# Patient Record
Sex: MALE | Race: BLACK OR AFRICAN AMERICAN | NOT HISPANIC OR LATINO | URBAN - METROPOLITAN AREA
[De-identification: names, ages, dates, MRNs, and addresses within clinical notes are randomized per-mention and may not be internally consistent; named-entity substitution may affect disease eponyms.]

---

## 2024-01-13 ENCOUNTER — EMERGENCY (EMERGENCY)
Facility: HOSPITAL | Age: 64
LOS: 1 days | Discharge: HOME | End: 2024-01-13
Attending: EMERGENCY MEDICINE
Payer: MEDICARE

## 2024-01-13 PROCEDURE — 99053 MED SERV 10PM-8AM 24 HR FAC: CPT

## 2024-01-13 PROCEDURE — 99283 EMERGENCY DEPT VISIT LOW MDM: CPT | Mod: 25

## 2024-01-13 PROCEDURE — 93010 ELECTROCARDIOGRAM REPORT: CPT

## 2024-01-13 PROCEDURE — 93005 ELECTROCARDIOGRAM TRACING: CPT

## 2024-01-13 PROCEDURE — 99285 EMERGENCY DEPT VISIT HI MDM: CPT

## 2024-01-14 VITALS
SYSTOLIC BLOOD PRESSURE: 129 MMHG | RESPIRATION RATE: 15 BRPM | TEMPERATURE: 98 F | HEART RATE: 75 BPM | OXYGEN SATURATION: 96 % | DIASTOLIC BLOOD PRESSURE: 71 MMHG

## 2024-01-14 VITALS
HEART RATE: 83 BPM | TEMPERATURE: 98 F | DIASTOLIC BLOOD PRESSURE: 87 MMHG | RESPIRATION RATE: 16 BRPM | SYSTOLIC BLOOD PRESSURE: 149 MMHG | OXYGEN SATURATION: 99 %

## 2024-01-14 NOTE — ED PROVIDER NOTE - PHYSICAL EXAMINATION
Gen: well appearing, no acute distress  Head: normocephalic, atraumatic  EENT: EOMI, moist mucous membranes  Lung: no increased work of breathing, clear to auscultation bilaterally, speaking in full sentences  CV: regular rate, regular rhythm, normal s1/s2, 2+ radial pulses bilaterally  Abd: soft, non-tender, non-distended  MSK: No edema, no visible deformities, full range of motion in all 4 extremities  Neuro: Awake, alert, oriented x 4. no focal neurologic deficits  Skin: No obvious rash, no jaundice  Psych: normal affect, occasionally tangential speech Gen: well appearing, no acute distress  Head: normocephalic, atraumatic  EENT: EOMI, moist mucous membranes  Lung: no increased work of breathing, clear to auscultation bilaterally, speaking in full sentences  CV: regular rate, regular rhythm, normal s1/s2, 2+ radial pulses bilaterally  Abd: soft, non-tender, non-distended  MSK: No edema, no visible deformities, full range of motion in all 4 extremities  Neuro: Awake, alert, oriented x 3. no focal neurologic deficits  Skin: No obvious rash, no jaundice  Psych: normal affect, occasionally tangential speech
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
## 2024-01-14 NOTE — ED PROVIDER NOTE - NSFOLLOWUPINSTRUCTIONS_ED_ALL_ED_FT
General Assault  Assault includes any behavior or physical attack that results in injury or threat to another person or damage to their property. This also includes assault that has not yet happened but is planned to happen, as well as threats that cause fear of assault.    Threats of assault may be physical, verbal, or written. They may be spoken or sent by any form of communication or media. The threats may be direct, implied, or understood.    What are the different forms of assault?  Forms of assault include:  Physically assaulting a person directly by slapping, hitting, kicking, or pushing.  Threats to inflict physical harm, which may include:  Verbal threats with language that is intimidating, hostile, or abusive.  Throwing or hitting objects.  Making intimidating or threatening gestures.  Displaying an object that appears to be a weapon in a threatening manner.  Stalking.  Sexually assaulting a person. Sexual assault is any sexual activity that a person is forced, threatened, or coerced to participate in. It may or may not involve physical contact with the person who is assaulting you. You are sexually assaulted if you are forced to have sexual contact of any kind.  Damaging or destroying a person's assistive equipment, such as glasses, canes, or walkers.  Using or displaying a weapon to harm or threaten someone. Examples of weapons may include guns, knives, sticks, or bats.  Using greater physical size or strength to intimidate someone by restraining them with force or bullying.  What can I do if I experience assault?    Report assaults, threats, and stalking to the police. Call your local emergency services (911 in the [place] if you are in immediate danger or you need medical help.  Work with a  or an advocate to get legal protection against someone who has assaulted you or threatened you with assault. Protection options may include:  Getting a court order that requires the person to stay away from you (restraining order).  Moving you to a private address.  Prosecuting the person through the courts. Laws vary depending on where you live.  Follow these instructions at home:  Avoid areas where you feel unsafe.  Try to stay in areas that are around other people.  Consider learning methods of protection from assault, such as self-defense.  Where to find support    If you have experienced assault, you may seek help from:  A professional counselor, family member, clergy, or a trusted friend to talk about what happened.  [facility] Sexual Assault Hotline: 482.123.7374 ([facility]). Live chat is also available at [employer]  The National Center for Victims of Crime. This is an advocacy center that provides information for people who have been assaulted or subjected to violence. Visit www.victimsofcrime.org  Summary  Assault includes any behavior or physical attack that results in injury or threat to another person or damage to their property.  An assault includes threats that cause a person to fear for his or her safety. Threats may be spoken or sent by any form of communication or media.  There are many forms of assault.  Report assaults, threats, and stalking to the police. Call your local emergency services (911 in the [place] if you are in immediate danger or you need medical help.  Prevent assault by being aware of your surroundings, avoiding areas where you feel unsafe, and talking to a  about getting legal protection against someone who has assaulted you or threatened you with assault.  This information is not intended to replace advice given to you by your health care provider. Make sure you discuss any questions you have with your health care provider. General Assault  Assault includes any behavior or physical attack that results in injury or threat to another person or damage to their property. This also includes assault that has not yet happened but is planned to happen, as well as threats that cause fear of assault.    Threats of assault may be physical, verbal, or written. They may be spoken or sent by any form of communication or media. The threats may be direct, implied, or understood.    What are the different forms of assault?  Forms of assault include:  Physically assaulting a person directly by slapping, hitting, kicking, or pushing.  Threats to inflict physical harm, which may include:  Verbal threats with language that is intimidating, hostile, or abusive.  Throwing or hitting objects.  Making intimidating or threatening gestures.  Displaying an object that appears to be a weapon in a threatening manner.  Stalking.  Sexually assaulting a person. Sexual assault is any sexual activity that a person is forced, threatened, or coerced to participate in. It may or may not involve physical contact with the person who is assaulting you. You are sexually assaulted if you are forced to have sexual contact of any kind.  Damaging or destroying a person's assistive equipment, such as glasses, canes, or walkers.  Using or displaying a weapon to harm or threaten someone. Examples of weapons may include guns, knives, sticks, or bats.  Using greater physical size or strength to intimidate someone by restraining them with force or bullying.  What can I do if I experience assault?    Report assaults, threats, and stalking to the police. Call your local emergency services (911 in the [place] if you are in immediate danger or you need medical help.  Work with a  or an advocate to get legal protection against someone who has assaulted you or threatened you with assault. Protection options may include:  Getting a court order that requires the person to stay away from you (restraining order).  Moving you to a private address.  Prosecuting the person through the courts. Laws vary depending on where you live.  Follow these instructions at home:  Avoid areas where you feel unsafe.  Try to stay in areas that are around other people.  Consider learning methods of protection from assault, such as self-defense.  Where to find support    If you have experienced assault, you may seek help from:  A professional counselor, family member, clergy, or a trusted friend to talk about what happened.  [facility] Sexual Assault Hotline: 627.494.9411 ([facility]). Live chat is also available at [employer]  The National Center for Victims of Crime. This is an advocacy center that provides information for people who have been assaulted or subjected to violence. Visit www.victimsofcrime.org  Summary  Assault includes any behavior or physical attack that results in injury or threat to another person or damage to their property.  An assault includes threats that cause a person to fear for his or her safety. Threats may be spoken or sent by any form of communication or media.  There are many forms of assault.  Report assaults, threats, and stalking to the police. Call your local emergency services (911 in the [place] if you are in immediate danger or you need medical help.  Prevent assault by being aware of your surroundings, avoiding areas where you feel unsafe, and talking to a  about getting legal protection against someone who has assaulted you or threatened you with assault.  This information is not intended to replace advice given to you by your health care provider. Make sure you discuss any questions you have with your health care provider. General Assault  Assault includes any behavior or physical attack that results in injury or threat to another person or damage to their property. This also includes assault that has not yet happened but is planned to happen, as well as threats that cause fear of assault.    Threats of assault may be physical, verbal, or written. They may be spoken or sent by any form of communication or media. The threats may be direct, implied, or understood.    What are the different forms of assault?  Forms of assault include:  Physically assaulting a person directly by slapping, hitting, kicking, or pushing.  Threats to inflict physical harm, which may include:  Verbal threats with language that is intimidating, hostile, or abusive.  Throwing or hitting objects.  Making intimidating or threatening gestures.  Displaying an object that appears to be a weapon in a threatening manner.  Stalking.  Sexually assaulting a person. Sexual assault is any sexual activity that a person is forced, threatened, or coerced to participate in. It may or may not involve physical contact with the person who is assaulting you. You are sexually assaulted if you are forced to have sexual contact of any kind.  Damaging or destroying a person's assistive equipment, such as glasses, canes, or walkers.  Using or displaying a weapon to harm or threaten someone. Examples of weapons may include guns, knives, sticks, or bats.  Using greater physical size or strength to intimidate someone by restraining them with force or bullying.  What can I do if I experience assault?    Report assaults, threats, and stalking to the police. Call your local emergency services (911 in the [place] if you are in immediate danger or you need medical help.  Work with a  or an advocate to get legal protection against someone who has assaulted you or threatened you with assault. Protection options may include:  Getting a court order that requires the person to stay away from you (restraining order).  Moving you to a private address.  Prosecuting the person through the courts. Laws vary depending on where you live.  Follow these instructions at home:  Avoid areas where you feel unsafe.  Try to stay in areas that are around other people.  Consider learning methods of protection from assault, such as self-defense.  Where to find support    If you have experienced assault, you may seek help from:  A professional counselor, family member, clergy, or a trusted friend to talk about what happened.  [facility] Sexual Assault Hotline: 210.742.5849 ([facility]OPE). Live chat is also available at [employer]  The National Center for Victims of Crime. This is an advocacy center that provides information for people who have been assaulted or subjected to violence. Visit www.victimsofcrime.org  Summary  Assault includes any behavior or physical attack that results in injury or threat to another person or damage to their property.  An assault includes threats that cause a person to fear for his or her safety. Threats may be spoken or sent by any form of communication or media.  There are many forms of assault.  Report assaults, threats, and stalking to the police. Call your local emergency services (911 in the [place] if you are in immediate danger or you need medical help.  Prevent assault by being aware of your surroundings, avoiding areas where you feel unsafe, and talking to a  about getting legal protection against someone who has assaulted you or threatened you with assault.  This information is not intended to replace advice given to you by your health care provider. Make sure you discuss any questions you have with your health care provider. General Assault  Assault includes any behavior or physical attack that results in injury or threat to another person or damage to their property. This also includes assault that has not yet happened but is planned to happen, as well as threats that cause fear of assault.    Threats of assault may be physical, verbal, or written. They may be spoken or sent by any form of communication or media. The threats may be direct, implied, or understood.    What are the different forms of assault?  Forms of assault include:  Physically assaulting a person directly by slapping, hitting, kicking, or pushing.  Threats to inflict physical harm, which may include:  Verbal threats with language that is intimidating, hostile, or abusive.  Throwing or hitting objects.  Making intimidating or threatening gestures.  Displaying an object that appears to be a weapon in a threatening manner.  Stalking.  Sexually assaulting a person. Sexual assault is any sexual activity that a person is forced, threatened, or coerced to participate in. It may or may not involve physical contact with the person who is assaulting you. You are sexually assaulted if you are forced to have sexual contact of any kind.  Damaging or destroying a person's assistive equipment, such as glasses, canes, or walkers.  Using or displaying a weapon to harm or threaten someone. Examples of weapons may include guns, knives, sticks, or bats.  Using greater physical size or strength to intimidate someone by restraining them with force or bullying.  What can I do if I experience assault?    Report assaults, threats, and stalking to the police. Call your local emergency services (911 in the [place] if you are in immediate danger or you need medical help.  Work with a  or an advocate to get legal protection against someone who has assaulted you or threatened you with assault. Protection options may include:  Getting a court order that requires the person to stay away from you (restraining order).  Moving you to a private address.  Prosecuting the person through the courts. Laws vary depending on where you live.  Follow these instructions at home:  Avoid areas where you feel unsafe.  Try to stay in areas that are around other people.  Consider learning methods of protection from assault, such as self-defense.  Where to find support    If you have experienced assault, you may seek help from:  A professional counselor, family member, clergy, or a trusted friend to talk about what happened.  [facility] [place] Sexual Assault Hotline: 615.465.3050 ([facility]OPE). Live chat is also available at [employer]  The National Center for Victims of Crime. This is an advocacy center that provides information for people who have been assaulted or subjected to violence. Visit www.victimsofcrime.org  Summary  Assault includes any behavior or physical attack that results in injury or threat to another person or damage to their property.  An assault includes threats that cause a person to fear for his or her safety. Threats may be spoken or sent by any form of communication or media.  There are many forms of assault.  Report assaults, threats, and stalking to the police. Call your local emergency services (911 in the [place] if you are in immediate danger or you need medical help.  Prevent assault by being aware of your surroundings, avoiding areas where you feel unsafe, and talking to a  about getting legal protection against someone who has assaulted you or threatened you with assault.  This information is not intended to replace advice given to you by your health care provider. Make sure you discuss any questions you have with your health care provider.
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
## 2024-01-14 NOTE — ED PROVIDER NOTE - PATIENT PORTAL LINK FT
You can access the FollowMyHealth Patient Portal offered by [facility] by registering at the following website: [email or web address]. By joining [employer]’s FollowMyHealth portal, you will also be able to view your health information using other applications (apps) compatible with our system. You can access the FollowMyHealth Patient Portal offered by [facility] by registering at the following website: [email or web address]. By joining [employer]’s FollowMyHealth portal, you will also be able to view your health information using other applications (apps) compatible with our system. You can access the FollowMyHealth Patient Portal offered by [facility] by registering at the following website: [email or web address]. By joining [employer]’s FollowMyHealth portal, you will also be able to view your health information using other applications (apps) compatible with our system. You can access the FollowMyHealth Patient Portal offered by [facility] by registering at the following website: [email or web address]. By joining [employer]’s FollowMyHealth portal, you will also be able to view your health information using other applications (apps) compatible with our system.
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
## 2024-01-14 NOTE — ED ADULT TRIAGE NOTE - NS ED NURSE AMBULANCES
[facility]
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
## 2024-01-19 ENCOUNTER — EMERGENCY (EMERGENCY)
Facility: HOSPITAL | Age: 64
LOS: 1 days | Discharge: HOME | End: 2024-01-19
Attending: STUDENT IN AN ORGANIZED HEALTH CARE EDUCATION/TRAINING PROGRAM
Payer: MEDICARE

## 2024-01-19 VITALS
SYSTOLIC BLOOD PRESSURE: 176 MMHG | HEART RATE: 84 BPM | TEMPERATURE: 98 F | OXYGEN SATURATION: 99 % | RESPIRATION RATE: 18 BRPM | DIASTOLIC BLOOD PRESSURE: 82 MMHG

## 2024-01-19 PROCEDURE — 99283 EMERGENCY DEPT VISIT LOW MDM: CPT

## 2024-01-19 PROCEDURE — 99284 EMERGENCY DEPT VISIT MOD MDM: CPT
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
## 2024-01-19 NOTE — ED PROVIDER NOTE - CLINICAL SUMMARY MEDICAL DECISION MAKING FREE TEXT BOX
Patient examines clinically sober at this time.  Requesting social work evaluation to assist with either shelter placement versus assistance in maneuvering situation at home.  Patient denies any feeling unsafe at this time however is unsure if he can go back to his home.   Fiorella pop, will talk with patient.

## 2024-01-19 NOTE — ED PROVIDER NOTE - PROGRESS NOTE DETAILS
Patient seen by ED , advised patient on what he should do tomorrow.  Given no safe discharge for this evening, will discharge tomorrow morning.  Patient understanding and agreeable with plan.  Zev Alvarado MD. Jono: Pt received in signout from Dr. Alvarado. Will arrange ambulance back to pt's residence. Stable for discharge.

## 2024-01-19 NOTE — ED PROVIDER NOTE - NSFOLLOWUPINSTRUCTIONS_ED_ALL_ED_FT
1) Follow-up with your Primary Medical Doctor or referred doctor. Call today / next business day for prompt follow-up.  2) Call 911 or go to the ED should your symptoms worsen, or should you develop any concerns whatsoever regarding your condition. Call 911 or return to the ED if you develop a fever greater than 101 F. Return to the ED if you develop chest pain, shortness of breath, weakness or ANY WORSENING OR CONCERNING SYMPTOM.  3)Your health is important to us. Should you have any concerns or questions about your condition, please do not hesitate to return to the Emergency Department.  4) See attached instruction sheets for additional information, including information regarding signs and symptoms to look out for, reasons to seek immediate care and other important instructions.  5) Follow-up with any specialists as discussed / noted as well.  6) If you were prescribed medications, please take them as prescribed.

## 2024-01-19 NOTE — ED ADULT TRIAGE NOTE - AS TEMP SITE
Anesthesia Post Evaluation    Patient: Pito Koehler    Procedure(s) Performed: Procedure(s) (LRB):  ADENOIDECTOMY (N/A)    Final Anesthesia Type: general      Patient location during evaluation: PACU  Patient participation: Yes- Able to Participate  Level of consciousness: awake and alert  Post-procedure vital signs: reviewed and stable  Pain management: adequate  Airway patency: patent    PONV status at discharge: No PONV  Anesthetic complications: no      Cardiovascular status: blood pressure returned to baseline  Respiratory status: unassisted, spontaneous ventilation and room air  Hydration status: euvolemic  Follow-up not needed.              Vitals Value Taken Time   /84 12/11/23 1012   Temp 36.7 °C (98.1 °F) 12/11/23 0951   Pulse 100 12/11/23 1012   Resp 20 12/11/23 1012   SpO2 100 % 12/11/23 1012         Event Time   Out of Recovery 10:27:58         Pain/Melita Score: Presence of Pain: non-verbal indicators present (12/11/2023 10:27 AM)           oral

## 2024-01-19 NOTE — ED ADULT NURSE NOTE - OBJECTIVE STATEMENT
Patient presents to ED after drinking several beers and having dispute with landkevend, coming in for evaluation and social work/living arrangement concerns.  Pt in no distress, no medical concerns at this time.

## 2024-01-19 NOTE — ED ADULT TRIAGE NOTE - CHIEF COMPLAINT QUOTE
patient brought in by ambulance admitting to drinking beer torres, ems reports they were called because PD needed him removed during an argument with haseeb, patient is cooperative without complaints

## 2024-01-19 NOTE — ED PROVIDER NOTE - PATIENT PORTAL LINK FT
You can access the FollowMyHealth Patient Portal offered by [facility] by registering at the following website: [email or web address]. By joining [email or web address]’s FollowMyHealth portal, you will also be able to view your health information using other applications (apps) compatible with our system.

## 2024-01-20 VITALS
TEMPERATURE: 98 F | SYSTOLIC BLOOD PRESSURE: 115 MMHG | OXYGEN SATURATION: 94 % | RESPIRATION RATE: 18 BRPM | HEART RATE: 85 BPM | DIASTOLIC BLOOD PRESSURE: 61 MMHG

## 2024-01-20 NOTE — ED ADULT NURSE REASSESSMENT NOTE - NS ED NURSE REASSESS COMMENT FT1
Patient resting comfortably on stretcher at this time, pending d/c home.  No medical concerns voiced.  Safety maintained.

## 2024-01-20 NOTE — ED ADULT NURSE REASSESSMENT NOTE - NS ED NURSE REASSESS COMMENT FT1
Patient brought in by ambulance admitting to drinking beer last night, ems reports they were called because PD needed him removed during an argument with haseeb. Today the patient is cooperative without complaints. Patient received from the over night shift. Patient is resting awaiting transport.

## 2024-01-26 NOTE — ED ADULT NURSE NOTE - CAS TRG GENERAL NORM CIRC DET
Patient is a 82y old  Male who presents with a chief complaint of failure to thrive, emerald, dehydration (25 Jan 2024 19:00)    Type: DX year known complications Endocrine Last seen Rx home Hx DKA/HHS, Glucometer checks, needs, weight, diet, exercise  diabetes education provided  Hx ASCVD, CKD, HF    HPI:  82yM pmhx HTN, T2DM, CVA, brought to ED from home for generalized weakness. Patient had recent GI illness with diarrhea x 3 days with "countless" episodes of watery diarrhea. Patient states he's had no appetite for the last 3 days and has been unable to eat at all. Patient has had very low fluid intake. Patient is accompanied by daughter who states that for the last week he has been very weak and he has trouble standing up and walking around. Patient's daughter states that the patient tested positive for the flu last week. The patient denies any symptoms of chest pain, SOB, nausea, vomiting.     ED Course    Vitals: 97.8F, 89bpm, 104/71, 99% RA  Labs: Hgb 12.5, wbc 3.22, Na 133, BUN 29, Cr 1.9, glucose 274, trop wnl  CT head: Parenchymal volume loss and chronic microvessel ischemic changes are identified. Old lacunar infarcts involving the right basal ganglia region are again seen.  CXR: no acute findings  EKG: NSR    Received in ED: 1L NS x 2  (22 Jan 2024 15:54)      PAST MEDICAL & SURGICAL HISTORY:  Diabetes      Hyperlipidemia      Kidney stone      CVA (cerebral vascular accident)      H/O lithotripsy      S/P inguinal hernia repair      H/O hand surgery          Allergies    No Known Allergies    Intolerances        MEDICATIONS  (STANDING):  amLODIPine   Tablet 5 milliGRAM(s) Oral daily  atorvastatin 20 milliGRAM(s) Oral at bedtime  dextrose 5%. 1000 milliLiter(s) (50 mL/Hr) IV Continuous <Continuous>  dextrose 5%. 1000 milliLiter(s) (100 mL/Hr) IV Continuous <Continuous>  dextrose 50% Injectable 12.5 Gram(s) IV Push once  dextrose 50% Injectable 25 Gram(s) IV Push once  dextrose 50% Injectable 25 Gram(s) IV Push once  glucagon  Injectable 1 milliGRAM(s) IntraMuscular once  heparin   Injectable 5000 Unit(s) SubCutaneous every 8 hours  influenza  Vaccine (HIGH DOSE) 0.7 milliLiter(s) IntraMuscular once  insulin lispro (ADMELOG) corrective regimen sliding scale   SubCutaneous at bedtime  insulin lispro (ADMELOG) corrective regimen sliding scale   SubCutaneous three times a day before meals  polyethylene glycol 3350 17 Gram(s) Oral daily  senna 1 Tablet(s) Oral at bedtime  tamsulosin 0.4 milliGRAM(s) Oral at bedtime       Strong peripheral pulses/Capillary refill less/equal to 2 seconds

## 2024-02-20 ENCOUNTER — EMERGENCY (EMERGENCY)
Facility: HOSPITAL | Age: 64
LOS: 1 days | Discharge: HOME | End: 2024-02-20
Attending: STUDENT IN AN ORGANIZED HEALTH CARE EDUCATION/TRAINING PROGRAM
Payer: MEDICARE

## 2024-02-20 ENCOUNTER — EMERGENCY (EMERGENCY)
Facility: HOSPITAL | Age: 64
LOS: 0 days | Discharge: HOME | End: 2024-02-20
Attending: STUDENT IN AN ORGANIZED HEALTH CARE EDUCATION/TRAINING PROGRAM
Payer: MEDICARE

## 2024-02-20 VITALS
RESPIRATION RATE: 18 BRPM | DIASTOLIC BLOOD PRESSURE: 102 MMHG | TEMPERATURE: 98 F | OXYGEN SATURATION: 98 % | HEART RATE: 79 BPM | SYSTOLIC BLOOD PRESSURE: 131 MMHG

## 2024-02-20 VITALS — WEIGHT: 179.9 LBS | HEIGHT: 65 IN

## 2024-02-20 DIAGNOSIS — Z79.01 LONG TERM (CURRENT) USE OF ANTICOAGULANTS: ICD-10-CM

## 2024-02-20 DIAGNOSIS — I10 ESSENTIAL (PRIMARY) HYPERTENSION: ICD-10-CM

## 2024-02-20 DIAGNOSIS — Z88.0 ALLERGY STATUS TO PENICILLIN: ICD-10-CM

## 2024-02-20 DIAGNOSIS — Z53.29 PROCEDURE AND TREATMENT NOT CARRIED OUT BECAUSE OF PATIENT'S DECISION FOR OTHER REASONS: ICD-10-CM

## 2024-02-20 DIAGNOSIS — T82.118A BREAKDOWN (MECHANICAL) OF OTHER CARDIAC ELECTRONIC DEVICE, INITIAL ENCOUNTER: ICD-10-CM

## 2024-02-20 DIAGNOSIS — Z86.718 PERSONAL HISTORY OF OTHER VENOUS THROMBOSIS AND EMBOLISM: ICD-10-CM

## 2024-02-20 DIAGNOSIS — Z95.0 PRESENCE OF CARDIAC PACEMAKER: ICD-10-CM

## 2024-02-20 DIAGNOSIS — Z51.89 ENCOUNTER FOR OTHER SPECIFIED AFTERCARE: ICD-10-CM

## 2024-02-20 LAB
ALBUMIN SERPL ELPH-MCNC: 3.7 G/DL — SIGNIFICANT CHANGE UP (ref 3.3–5)
ALP SERPL-CCNC: 102 U/L — SIGNIFICANT CHANGE UP (ref 40–120)
ALT FLD-CCNC: 33 U/L — SIGNIFICANT CHANGE UP (ref 12–78)
ANION GAP SERPL CALC-SCNC: 10 MMOL/L — SIGNIFICANT CHANGE UP (ref 5–17)
AST SERPL-CCNC: 34 U/L — SIGNIFICANT CHANGE UP (ref 15–37)
BASOPHILS # BLD AUTO: 0 K/UL — SIGNIFICANT CHANGE UP (ref 0–0.2)
BASOPHILS NFR BLD AUTO: 0 % — SIGNIFICANT CHANGE UP (ref 0–2)
BILIRUB SERPL-MCNC: 0.2 MG/DL — SIGNIFICANT CHANGE UP (ref 0.2–1.2)
BUN SERPL-MCNC: 8 MG/DL — SIGNIFICANT CHANGE UP (ref 7–23)
CALCIUM SERPL-MCNC: 9.3 MG/DL — SIGNIFICANT CHANGE UP (ref 8.5–10.1)
CHLORIDE SERPL-SCNC: 107 MMOL/L — SIGNIFICANT CHANGE UP (ref 96–108)
CO2 SERPL-SCNC: 22 MMOL/L — SIGNIFICANT CHANGE UP (ref 22–31)
CREAT SERPL-MCNC: 0.61 MG/DL — SIGNIFICANT CHANGE UP (ref 0.5–1.3)
EGFR: 108 ML/MIN/1.73M2 — SIGNIFICANT CHANGE UP
EOSINOPHIL # BLD AUTO: 0.07 K/UL — SIGNIFICANT CHANGE UP (ref 0–0.5)
EOSINOPHIL NFR BLD AUTO: 1 % — SIGNIFICANT CHANGE UP (ref 0–6)
GLUCOSE SERPL-MCNC: 100 MG/DL — HIGH (ref 70–99)
HCT VFR BLD CALC: 42.8 % — SIGNIFICANT CHANGE UP (ref 39–50)
HGB BLD-MCNC: 14.1 G/DL — SIGNIFICANT CHANGE UP (ref 13–17)
LYMPHOCYTES # BLD AUTO: 1.84 K/UL — SIGNIFICANT CHANGE UP (ref 1–3.3)
LYMPHOCYTES # BLD AUTO: 28 % — SIGNIFICANT CHANGE UP (ref 13–44)
MAGNESIUM SERPL-MCNC: 2 MG/DL — SIGNIFICANT CHANGE UP (ref 1.6–2.6)
MCHC RBC-ENTMCNC: 29.9 PG — SIGNIFICANT CHANGE UP (ref 27–34)
MCHC RBC-ENTMCNC: 32.9 GM/DL — SIGNIFICANT CHANGE UP (ref 32–36)
MCV RBC AUTO: 90.9 FL — SIGNIFICANT CHANGE UP (ref 80–100)
MONOCYTES # BLD AUTO: 1.18 K/UL — HIGH (ref 0–0.9)
MONOCYTES NFR BLD AUTO: 18 % — HIGH (ref 2–14)
NEUTROPHILS # BLD AUTO: 3.49 K/UL — SIGNIFICANT CHANGE UP (ref 1.8–7.4)
NEUTROPHILS NFR BLD AUTO: 53 % — SIGNIFICANT CHANGE UP (ref 43–77)
NRBC # BLD: SIGNIFICANT CHANGE UP /100 WBCS (ref 0–0)
PLATELET # BLD AUTO: 281 K/UL — SIGNIFICANT CHANGE UP (ref 150–400)
POTASSIUM SERPL-MCNC: 3.5 MMOL/L — SIGNIFICANT CHANGE UP (ref 3.5–5.3)
POTASSIUM SERPL-SCNC: 3.5 MMOL/L — SIGNIFICANT CHANGE UP (ref 3.5–5.3)
PROT SERPL-MCNC: 8.1 GM/DL — SIGNIFICANT CHANGE UP (ref 6–8.3)
RBC # BLD: 4.71 M/UL — SIGNIFICANT CHANGE UP (ref 4.2–5.8)
RBC # FLD: 15.1 % — HIGH (ref 10.3–14.5)
SODIUM SERPL-SCNC: 139 MMOL/L — SIGNIFICANT CHANGE UP (ref 135–145)
TROPONIN I, HIGH SENSITIVITY RESULT: 9.38 NG/L — SIGNIFICANT CHANGE UP
WBC # BLD: 6.58 K/UL — SIGNIFICANT CHANGE UP (ref 3.8–10.5)
WBC # FLD AUTO: 6.58 K/UL — SIGNIFICANT CHANGE UP (ref 3.8–10.5)

## 2024-02-20 PROCEDURE — 93005 ELECTROCARDIOGRAM TRACING: CPT

## 2024-02-20 PROCEDURE — L9997: CPT

## 2024-02-20 PROCEDURE — 93010 ELECTROCARDIOGRAM REPORT: CPT

## 2024-02-20 PROCEDURE — 99284 EMERGENCY DEPT VISIT MOD MDM: CPT | Mod: 25

## 2024-02-20 PROCEDURE — 71045 X-RAY EXAM CHEST 1 VIEW: CPT | Mod: 26

## 2024-02-20 PROCEDURE — 85025 COMPLETE CBC W/AUTO DIFF WBC: CPT

## 2024-02-20 PROCEDURE — 99285 EMERGENCY DEPT VISIT HI MDM: CPT | Mod: 25

## 2024-02-20 PROCEDURE — 80053 COMPREHEN METABOLIC PANEL: CPT

## 2024-02-20 PROCEDURE — 71045 X-RAY EXAM CHEST 1 VIEW: CPT | Mod: 26,76

## 2024-02-20 PROCEDURE — 71045 X-RAY EXAM CHEST 1 VIEW: CPT

## 2024-02-20 PROCEDURE — 84484 ASSAY OF TROPONIN QUANT: CPT

## 2024-02-20 PROCEDURE — 99285 EMERGENCY DEPT VISIT HI MDM: CPT

## 2024-02-20 PROCEDURE — 93010 ELECTROCARDIOGRAM REPORT: CPT | Mod: 76

## 2024-02-20 PROCEDURE — 83735 ASSAY OF MAGNESIUM: CPT

## 2024-02-20 PROCEDURE — 36415 COLL VENOUS BLD VENIPUNCTURE: CPT

## 2024-02-20 NOTE — ED PROVIDER NOTE - CLINICAL SUMMARY MEDICAL DECISION MAKING FREE TEXT BOX
Tiffanie Thibodeaux MD, Attending  plan: review earlier labs and workup, EKG, CXR, DC    pacemaker interrogation earlier today (copied from earlier provider note)  At that ED visit the Medtronic PM interrogated, report per [employer] Transmission Note:   "No device or lead performance issues observed. DEVICE ASSESSMENT: Available daily battery/lead measurements within expected range. Lead trends stable. ARRYTHMIA SUMMARY: Since data last cleared on 17-Aug-2023, Based on programmed zones, device detected/treated: 4 monitored VT-NS episodes most recent on 02-Jan-2024.

## 2024-02-20 NOTE — ED ADULT NURSE NOTE - OBJECTIVE STATEMENT
Pt presents to the ED AO x 4, c/o ACID misfire. Pt here earlier in the day and left AMA due to fear of needles. Pt reports he felt the AICD shock him today. Pt states he does not feel safe in his living situation. Pt reports he is scheduled to go to a different facility on Thursday, but does not want to wait till then. Pt endorses he is noncompliant with all his medications and states "I am waiting for God." Pt denies SI/HI. Respirations even and unlabored.

## 2024-02-20 NOTE — ED PROVIDER NOTE - OBJECTIVE STATEMENT
63 year old male with PMHx thyroid cancer (not actively being treated, on hospice), DVTs on eliquis, HTN, Medtronic pacemaker placement Jan 2019 in [place]. pt is here stating his pacemaker is firing multiple times a day. currently living ni [facility] shelter. pt recently seen 1/19 for housing issue and eviction. and before that pt seen at [facility] on 2/14/24 stating that his pacemaker has been firing multiple times a day.   At that ED visit the Medtronic PM interrogated, report per [employer] Transmission Note:   "No device or lead performance issues observed. DEVICE ASSESSMENT: Available daily battery/lead measurements within expected range. Lead trends stable. ARRYTHMIA SUMMARY: Since data last cleared on 17-Aug-2023, Based on programmed zones, device detected/treated: 4 monitored VT-NS episodes most recent on 02-Jan-2024.

## 2024-02-20 NOTE — ED PROVIDER NOTE - OBJECTIVE STATEMENT
C.C Pre-op Exam      HPI : Sonam Hidalgo is a 28 y.o. female  for evaluation and discussion of treatment options for Pre-op Exam  pelvic pain is significant.  From 10/10 pain it is down to 4/10.  She is ready for surgical intervention and ovarian cystectomy.  SHE HAS MIRENA IUD IN PLACE.   US shows  Endometrium: Normal in this pre menopausal patient, measuring 8 mm.    Right ovary:    Size: 7 1 x 7.7 x 8.0 cm    Appearance: There is a predominantly anechoic cyst measuring 6.5 x 7.2 x 7.3 cm (previously 5.7 x 5.0 x 6.6 cm) with thin internal septation.    Vascular flow: Normal.    Left ovary:    Not visualized    Free Fluid:    Small volume free fluid in the cul-de-sac, likely physiological.      Impression       No acute sonographic abnormality.  The left ovary is not visualized on today's exam.    There is slight interval enlargement of right ovarian cyst.         Past Medical History:   Diagnosis Date    Abnormal Pap smear of vagina     Allergy     Anxiety     Hypertension      Past Surgical History:   Procedure Laterality Date    CHOLECYSTECTOMY      median arcuate ligament        Family History   Problem Relation Age of Onset    Breast cancer Mother     Alcohol abuse Father     Celiac disease Neg Hx     Cirrhosis Neg Hx     Colon cancer Neg Hx     Colon polyps Neg Hx     Crohn's disease Neg Hx     Cystic fibrosis Neg Hx     Esophageal cancer Neg Hx     Hemochromatosis Neg Hx     Inflammatory bowel disease Neg Hx     Irritable bowel syndrome Neg Hx     Liver cancer Neg Hx     Liver disease Neg Hx     Rectal cancer Neg Hx     Stomach cancer Neg Hx     Ulcerative colitis Neg Hx     Jacob's disease Neg Hx     Ovarian cancer Neg Hx     Arrhythmia Neg Hx     Cardiomyopathy Neg Hx     Congenital heart disease Neg Hx     Early death Neg Hx     Heart attacks under age 50 Neg Hx     Pacemaker/defibrilator Neg Hx      Social History     Tobacco Use    Smoking status: Never  "Smoker    Smokeless tobacco: Never Used   Substance Use Topics    Alcohol use: Yes     Comment: occas, none recently    Drug use: No     OB History    Para Term  AB Living   4 2 2 0 1 2   SAB TAB Ectopic Multiple Live Births   0 1 0 0 2      # Outcome Date GA Lbr Angelito/2nd Weight Sex Delivery Anes PTL Lv   4             3 Term 19 37w2d / 00:10 3.033 kg (6 lb 11 oz) F Vag-Spont None N ZENA   2 Term 11/30/15 38w3d  2.81 kg (6 lb 3.1 oz) M Vag-Spont EPI N ZENA      Birth Comments: NICU x 4 days.    Needed HFNC and CPAP but no intubation.  Abx for 48 hour rule out, blood cx negative.   Hepatitis B given on 12/3/15   1 TAB                /68   Ht 5' 3" (1.6 m)   Wt 111.4 kg (245 lb 9.5 oz)   LMP 2019 (Exact Date)   Breastfeeding? No   BMI 43.50 kg/m²     ROS:    GENERAL: Denies weight gain or weight loss. Feeling well overall.   SKIN: Denies rash or lesions.   HEAD: Denies head injury or headache.   NODES: Denies enlarged lymph nodes.   CHEST: Denies chest pain or shortness of breath.   CARDIOVASCULAR: Denies palpitations or left sided chest pain.   ABDOMEN: No abdominal pain, constipation, diarrhea, nausea, vomiting or rectal bleeding.   URINARY: No frequency, dysuria, hematuria, or burning on urination.  REPRODUCTIVE: See HPI.   BREASTS: The patient performs breast self-examination and denies pain, lumps, or nipple discharge.   HEMATOLOGIC: No easy bruisability or excessive bleeding with the exception of menstrual cycles.  MUSCULOSKELETAL: Denies joint pain or swelling.   NEUROLOGIC: Denies syncope or weakness.   PSYCHIATRIC: Denies depression, anxiety or mood swings.    PHYSICAL EXAM:    APPEARANCE: Well nourished, well developed, in no acute distress.  AFFECT: WNL, alert and oriented x 3  SKIN: No acne or hirsutism  NECK: Neck symmetric without masses or thyromegaly  NODES: No inguinal, cervical, axillary, or femoral lymph node enlargement  CHEST: Good respiratory " effect  ABDOMEN: Soft.  No tenderness or masses.  No hepatosplenomegaly.  No hernias.  PELVIC: Normal external genitalia without lesions.  Normal hair distribution.  Adequate perineal body, normal urethral meatus.  Vagina moist and well rugated without lesions or discharge.  Cervix pink, IUD present without lesions, discharge or tenderness.  No significant cystocele or rectocele.  Bimanual exam shows uterus to be normal size, regular, mobile and nontender.  Adnexa without masses or tenderness.    EXTREMITIES: No edema.    ASSESSMENT & PLAN:    1. Cyst of ovary, unspecified laterality    2. IUD (intrauterine device) in place        I have discussed the risks, benefits, indications, and alternatives of the procedure in detail.  The patient verbalizes her understanding.  All questions answered.  Consents signed.  The patient agrees to proceed to proceed as planned.      Will procede with DX scope and ovarian cystectomy  She wants to keep the IUD in place  Post op appt in four weeks   Vicodin rx sent for pain           63 year old male with PMHx thyroid cancer (not actively being treated, on hospice), DVTs on eliquis, HTN, Medtronic pacemaker placement Jan 2019 in [place], seen earlier in the ED for "pacemaker firing", but left AMA before labs returned, presents from [facility] shelter for housing issues because [facility] cannot accept him back if he signed out AMA. No medical complaints today. Pt states he has housing on Thursday.

## 2024-02-20 NOTE — ED ADULT NURSE NOTE - OBJECTIVE STATEMENT
pt presents to ER c/o pacemaker firing x3 days. pt states "this pacemaker has been giving my problems. I am also dying, I have stage 4 thyroid cancer and haven't been able to take my meds." pt currently living in homeless shelter. denies any other medical complaints.

## 2024-02-20 NOTE — ED PROVIDER NOTE - NSFOLLOWUPINSTRUCTIONS_ED_ALL_ED_FT
** Follow up with your primary care doctor in the next 72 hours.   ** Go to the nearest Emergency Department if you experience any new or concerning symptoms, such as:   - worsening pain  - chest pain  - difficulty breathing  - passing out  - unable to eat or drink  - unable to move or feel part of your body  - fever, chills

## 2024-02-20 NOTE — ED ADULT TRIAGE NOTE - CHIEF COMPLAINT QUOTE
patient brought in by EMS for AICD firing "a couple times". patient direct to trauma room for EKG and monitoring. Dr. Granados and primary RN aware.

## 2024-02-20 NOTE — ED ADULT TRIAGE NOTE - CHIEF COMPLAINT QUOTE
patient was evaluated earlier in the ED after AICD fired.  patient states he left AMA because he is dying of thyroid cancer and does not wish to be stuck with needles.  patient returned to ED with staff from [facility] this evening because patient requires discharge papers with medical diagnosis to return to [facility].  patient currently has no medical complaints.

## 2024-02-20 NOTE — ED ADULT NURSE REASSESSMENT NOTE - NS ED NURSE REASSESS COMMENT FT1
Pt wants to leave AMA. Pt educated the risk and benefits of leaving the hospital. Pt verbalized understanding. Pt signed AMA paperwork. IV removed. Pt ambulated to the exit with a steady gait and walker.

## 2024-02-20 NOTE — ED PROVIDER NOTE - PHYSICAL EXAMINATION
Tiffanie Thibodeaux MD, Attending  Gen: Well appearing in NAD   Head: NC/AT  Neck: trachea midline  Resp:  No distress  Ext: no deformities  Neuro:  A&O appears non focal  Skin:  Warm and dry as visualized  Psych:  Normal affect and mood

## 2024-02-20 NOTE — ED PROVIDER NOTE - PROGRESS NOTE DETAILS
Tiffanie Thibodeaux MD, Attending  case discussed with Dr Hinkle who saw patient earlier.   No plans for admission if prior labs were WNL.

## 2024-02-20 NOTE — ED ADULT NURSE NOTE - CHIEF COMPLAINT QUOTE
patient was evaluated earlier in the ED after AICD fired.  patient states he left AMA because he is dying of thyroid cancer and does not wish to be stuck with needles.  patient returned to ED with staff from [facility] this evening because patient requires discharge papers with medical diagnosis to return to [facility].  patient currently has no medical complaints.

## 2024-02-20 NOTE — ED ADULT NURSE NOTE - NSFALLRISKINTERV_ED_ALL_ED

## 2024-02-20 NOTE — ED ADULT NURSE NOTE - NSFALLUNIVINTERV_ED_ALL_ED
Bed/Stretcher in lowest position, wheels locked, appropriate side rails in place/Call bell, personal items and telephone in reach/Instruct patient to call for assistance before getting out of bed/chair/stretcher/Non-slip footwear applied when patient is off stretcher/[place] to call system/Physically safe environment - no spills, clutter or unnecessary equipment/Purposeful proactive rounding/Room/bathroom lighting operational, light cord in reach

## 2024-02-20 NOTE — ED PROVIDER NOTE - PROGRESS NOTE DETAILS
Dr. Granados ED attending 63yoM [facility]  thyroid cancer (not actively being treated, DVTs on eliquis, HTN, Medtronic pacemaker placement Jan 2019 presents stating his pacemaker defibrillator has been firing every day for the last few days. Denies CP, SOB, NV, abdominal pain, palpitations, lightheadedness. States he does not follow with any doctors currently and lives in a shelter, states he would like to leave. He states he told the staff at the shelter and they called EMS. Denies etoh or drug use.   Constitutional: well appearing, NAD AAOx3  Eyes: EOMI, PERRL  Head: Normocephalic atraumatic  Mouth: no airway obstruction, posterior oropharynx clear without erythema or exudate  Neck: supple  Cardiac: regular rate and rhythm, no MRG  Resp: Lungs CTAB  GI: Abd s/nt/nd  Neuro: CN2-12 intact, strength 5/5x4, sensation grossly intact  Skin: No rashes  Plan: EKG, labs, PPM interrogation, cardiac monitor, CXR, reassess The pt is clinically sober, A&Ox3, free from distracting injury.  Throughout our interactions in the ED today, the pt has demonstrated concrete thinking/reasoning, has maintained an orderly/reasonable conversation, appears to have intact insight/judgment/reason, a sound mind and therefore in our opinion has capacity now to make decisions.  Given the pt’s presentation, we communicated (in laymen's terms) our concerns.  The pt verbalized an understanding of our worries.  We’ve communicated to the patient that the ED evaluation is incomplete & many troublesome conditions haven’t been r/o.  We have discussed the need for further ED w/u so we can get more information about the pt’s condition.  We have discussed the range of possible dx, potential testing & tx options.  Our discussions included the potential outcomes of leaving AMA, including worsening of their condition, becoming permanently disabled/in pain/critically ill, or death.  Despite these efforts, we were unable to convince the pt to stay.  The pt is refusing any further care and is leaving against medical advice. We have attempted to offer tx/rx/guidance for any dangerous conditions which are most likely and/or dangerous.  We have answered all questions and have implored the pt to return ASAP to complete the w/u.  Paperwork completed, pt understands risk of death if he leaves at this time, invited to return at any point for assessment

## 2024-02-20 NOTE — ED PROVIDER NOTE - CLINICAL SUMMARY MEDICAL DECISION MAKING FREE TEXT BOX
63 year old male with PMHx thyroid cancer (not actively being treated, on hospice), DVTs on eliquis, HTN, Medtronic pacemaker placement Jan 2019 in [place]. pt is here stating his pacemaker is firing multiple times a day. currently living ni [facility] shelter. pt recently seen 1/19 for housing issue and eviction. and before that pt seen at [facility] on 2/14/24 stating that his pacemaker has been firing multiple times a day.   At that ED visit the Medtronic PM interrogated, report per [employer] Transmission Note:   "No device or lead performance issues observed. DEVICE ASSESSMENT: Available daily battery/lead measurements within expected range. Lead trends stable. ARRYTHMIA SUMMARY: Since data last cleared on 17-Aug-2023, Based on programmed zones, device detected/treated: 4 monitored VT-NS episodes most recent on 02-Jan-2024. 63 year old male with PMHx thyroid cancer (not actively being treated, on hospice), DVTs on eliquis, HTN, Medtronic pacemaker placement Jan 2019 in [place]. pt is here stating his pacemaker is firing multiple times a day. currently living ni [facility] shelter. pt recently seen 1/19 for housing issue and eviction. and before that pt seen at [facility] on 2/14/24 stating that his pacemaker has been firing multiple times a day.   At that ED visit the Medtronic PM interrogated, report per [employer] Transmission Note:   "No device or lead performance issues observed. DEVICE ASSESSMENT: Available daily battery/lead measurements within expected range. Lead trends stable. ARRYTHMIA SUMMARY: Since data last cleared on 17-Aug-2023, Based on programmed zones, device detected/treated: 4 monitored VT-NS episodes most recent on 02-Jan-2024.    The pt is clinically sober, A&Ox3, free from distracting injury.  Throughout our interactions in the ED today, the pt has demonstrated concrete thinking/reasoning, has maintained an orderly/reasonable conversation, appears to have intact insight/judgment/reason, a sound mind and therefore in our opinion has capacity now to make decisions.  Given the pt’s presentation, we communicated (in laymen's terms) our concerns.  The pt verbalized an understanding of our worries.  We’ve communicated to the patient that the ED evaluation is incomplete & many troublesome conditions haven’t been r/o.  We have discussed the need for further ED w/u so we can get more information about the pt’s condition.  We have discussed the range of possible dx, potential testing & tx options.  Our discussions included the potential outcomes of leaving AMA, including worsening of their condition, becoming permanently disabled/in pain/critically ill, or death.  Despite these efforts, we were unable to convince the pt to stay.  The pt is refusing any further care and is leaving against medical advice. We have attempted to offer tx/rx/guidance for any dangerous conditions which are most likely and/or dangerous.  We have answered all questions and have implored the pt to return ASAP to complete the w/u.

## 2024-02-20 NOTE — ED PROVIDER NOTE - ATTENDING APP SHARED VISIT CONTRIBUTION OF CARE
I, Geeta Granados DO, personally saw the patient with the PA, and completed the key components of the history and physical exam. I then discussed the management plan with the  PA

## 2024-02-20 NOTE — ED PROVIDER NOTE - PATIENT PORTAL LINK FT
You can access the FollowMyHealth Patient Portal offered by [facility] by registering at the following website: [email or web address]. By joining [employer]’s FollowMyHealth portal, you will also be able to view your health information using other applications (apps) compatible with our system.

## 2024-02-20 NOTE — ED PROVIDER NOTE - NSFOLLOWUPINSTRUCTIONS_ED_ALL_ED_FT
Return to the Emergency Department for worsening or persistent symptoms, and/or ANY NEW OR CONCERNING SYMPTOMS. If you have issues obtaining follow up, please call: [phone] or [phone]  to obtain a doctor or specialist who takes your insurance in your area.    Follow up with your primary doctor asap.

## 2024-02-21 VITALS
RESPIRATION RATE: 16 BRPM | OXYGEN SATURATION: 100 % | TEMPERATURE: 98 F | SYSTOLIC BLOOD PRESSURE: 142 MMHG | DIASTOLIC BLOOD PRESSURE: 68 MMHG | HEART RATE: 69 BPM

## 2024-02-21 NOTE — ED ADULT NURSE REASSESSMENT NOTE - NS ED NURSE REASSESS COMMENT FT1
Pt resting in stretcher at this time with no new complaints. Pt is to be seen by social work later in the morning. Pt safety maintained.

## 2024-02-21 NOTE — CHART NOTE - NSCHARTNOTEFT_GEN_A_CORE
Pt is a 63 yr old with a PMHx of thyroid cancer (not actively being treated, on hospice), DVTs on Eliquis, HTN, Medtronic pacemaker placement Jan 2019 in [place]. REZA informed that pt is cleared medically for discharge. Pt currently residing at [facility], [address] [place] [address]. Pt requesting SW assist [facility] today.  REZA with Carmel @ [facility] [phone]. Carmel reports pt should return to [facility] this morning and pursue same request through shelter staff as she is currently unable to complete this request at this time. REZA informed pt of same, and is in agreement. [facility] will send van between 8:30-9am to transport pt back to [facility], per assistant Piedad @[facility] [phone]. Case discussed with MD and RN.

## 2024-05-15 ENCOUNTER — EMERGENCY (EMERGENCY)
Facility: HOSPITAL | Age: 64
LOS: 0 days | Discharge: HOME | End: 2024-05-15
Attending: STUDENT IN AN ORGANIZED HEALTH CARE EDUCATION/TRAINING PROGRAM
Payer: MEDICARE

## 2024-05-15 VITALS
TEMPERATURE: 98 F | OXYGEN SATURATION: 94 % | RESPIRATION RATE: 16 BRPM | DIASTOLIC BLOOD PRESSURE: 75 MMHG | HEIGHT: 69 IN | HEART RATE: 70 BPM | SYSTOLIC BLOOD PRESSURE: 123 MMHG | WEIGHT: 149.91 LBS

## 2024-05-15 DIAGNOSIS — I11.0 HYPERTENSIVE HEART DISEASE WITH HEART FAILURE: ICD-10-CM
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
## 2024-05-15 DIAGNOSIS — I50.20 UNSPECIFIED SYSTOLIC (CONGESTIVE) HEART FAILURE: ICD-10-CM

## 2024-05-15 DIAGNOSIS — S70.02XA CONTUSION OF LEFT HIP, INITIAL ENCOUNTER: ICD-10-CM

## 2024-05-15 DIAGNOSIS — Z88.0 ALLERGY STATUS TO PENICILLIN: ICD-10-CM

## 2024-05-15 DIAGNOSIS — Z95.810 PRESENCE OF AUTOMATIC (IMPLANTABLE) CARDIAC DEFIBRILLATOR: ICD-10-CM

## 2024-05-15 DIAGNOSIS — M54.6 PAIN IN THORACIC SPINE: ICD-10-CM
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
## 2024-05-15 DIAGNOSIS — G62.9 POLYNEUROPATHY, UNSPECIFIED: ICD-10-CM

## 2024-05-15 DIAGNOSIS — M19.90 UNSPECIFIED OSTEOARTHRITIS, UNSPECIFIED SITE: ICD-10-CM

## 2024-05-15 DIAGNOSIS — M25.552 PAIN IN LEFT HIP: ICD-10-CM

## 2024-05-15 PROCEDURE — 76376 3D RENDER W/INTRP POSTPROCES: CPT

## 2024-05-15 PROCEDURE — 99284 EMERGENCY DEPT VISIT MOD MDM: CPT | Mod: 25

## 2024-05-15 PROCEDURE — 72192 CT PELVIS W/O DYE: CPT | Mod: 26,MC

## 2024-05-15 PROCEDURE — 99284 EMERGENCY DEPT VISIT MOD MDM: CPT

## 2024-05-15 PROCEDURE — 72192 CT PELVIS W/O DYE: CPT | Mod: MC

## 2024-05-15 PROCEDURE — 76376 3D RENDER W/INTRP POSTPROCES: CPT | Mod: 26

## 2024-05-15 RX ORDER — ACETAMINOPHEN 500 MG
650 TABLET ORAL ONCE
Refills: 0 | Status: COMPLETED | OUTPATIENT
Start: 2024-05-15 | End: 2024-05-15

## 2024-05-15 RX ADMIN — Medication 650 MILLIGRAM(S): at 20:27
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
## 2024-05-15 NOTE — ED STATDOCS - NSFOLLOWUPCLINICS_GEN_ALL_ED_FT
[facility]  Family Medicine  [address] [place] [address]  Phone: [phone]  Fax:

## 2024-05-15 NOTE — ED ADULT NURSE NOTE - NSFALLRISKINTERV_ED_ALL_ED

## 2024-05-15 NOTE — ED STATDOCS - ATTENDING APP SHARED VISIT CONTRIBUTION OF CARE
I, Geeta Granados DO,  performed the initial face to face bedside interview with this patient regarding history of present illness, review of symptoms and relevant past medical, social and family history.  I completed an independent physical examination.  I was the initial provider who evaluated this patient.   I personally saw the patient and performed a substantive portion of the visit including all aspects of the medical decision making.  I have signed out the follow up of any pending tests (i.e. labs, radiological studies) to the PRO.  I have communicated the patient’s plan of care and disposition with the PRO.  The history, relevant review of systems, past medical and surgical history, medical decision making, and physical examination was documented by the scribe in my presence and I attest to the accuracy of the documentation.
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
## 2024-05-15 NOTE — ED STATDOCS - NSFOLLOWUPINSTRUCTIONS_ED_ALL_ED_FT
Follow up with an orthopedist for further evaluation of your symptoms  Apply ice to the area of pain.   Take motrin or tylenol for pain.   Return to the Emergency Department for worsening or persistent symptoms, and/or ANY NEW OR CONCERNING SYMPTOMS. If you have issues obtaining follow up, please call: [phone] or [phone]  to obtain a doctor or specialist who takes your insurance in your area.       Contusion in Adults    WHAT YOU NEED TO KNOW:    A contusion is a bruise that appears on your skin after an injury. A bruise happens when small blood vessels tear but skin does not. When blood vessels tear, blood leaks into nearby tissue, such as soft tissue or muscle.    DISCHARGE INSTRUCTIONS:    Return to the emergency department if:     You have new trouble moving the injured area.      You have tingling or numbness in or near the injured area.      Your hand or foot below the bruise gets cold or turns pale.     Contact your healthcare provider if:     You find a new lump in the injured area.      Your symptoms do not improve with treatment after 4 to 5 days.      You have questions or concerns about your condition or care.    Medicines: You may need any of the following:     NSAIDs help decrease swelling and pain or fever. This medicine is available with or without a doctor's order. NSAIDs can cause stomach bleeding or kidney problems in certain people. If you take blood thinner medicine, always ask your healthcare provider if NSAIDs are safe for you. Always read the medicine label and follow directions.      Prescription pain medicine may be given. Do not wait until the pain is severe before you take your medicine.      Take your medicine as directed. Contact your healthcare provider if you think your medicine is not helping or if you have side effects. Tell him of her if you are allergic to any medicine. Keep a list of the medicines, vitamins, and herbs you take. Include the amounts, and when and why you take them. Bring the list or the pill bottles to follow-up visits. Carry your medicine list with you in case of an emergency.    Follow up with your healthcare provider as directed: You may need to return within a week to check your injury again. Write down your questions so you remember to ask them during your visits.    Help a contusion heal:     Rest the injured area or use it less than usual. If you bruised your leg or foot, you may need crutches or a cane to help you walk. This will help you keep weight off your injured body part.       Apply ice to decrease swelling and pain. Ice may also help prevent tissue damage. Use an ice pack, or put crushed ice in a plastic bag. Cover it with a towel and place it on your bruise for 15 to 20 minutes every hour or as directed.      Use compression to support the area and decrease swelling. Wrap an elastic bandage around the area over the bruised muscle. Make sure the bandage is not too tight. You should be able to fit 1 finger between the bandage and your skin.      Elevate (raise) your injured body part above the level of your heart to help decrease pain and swelling. Use pillows, blankets, or rolled towels to elevate the area as often as you can.      Do not drink alcohol as directed. Alcohol may slow healing.      Do not stretch injured muscles right after your injury. Ask your healthcare provider when and how you may safely stretch after your injury. Gentle stretches can help increase your flexibility.      Do not massage the area or put heating pads on the bruise right after your injury. Heat and massage may slow healing. Your healthcare provider may tell you to apply heat after several days. At that time, heat will start to help the injury heal.    Prevent another contusion:     Stretch and warm up before you play sports or exercise.      Wear protective gear when you play sports. Examples are shin guards and padding.       If you begin a new physical activity, start slowly to give your body a chance to adjust.

## 2024-05-15 NOTE — ED STATDOCS - PATIENT PORTAL LINK FT
You can access the FollowMyHealth Patient Portal offered by [facility] by registering at the following website: [email or web address]. By joining [employer]’s FollowMyHealth portal, you will also be able to view your health information using other applications (apps) compatible with our system.
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
## 2024-05-15 NOTE — ED STATDOCS - PHYSICAL EXAMINATION
Constitutional: well appearing, NAD AAOx3  Eyes: EOMI, PERRL  Head: Normocephalic atraumatic  Mouth: no airway obstruction, posterior oropharynx clear without erythema or exudate  Neck: supple  Cardiac: regular rate and rhythm, no MRG  Resp: Lungs CTAB  GI: Abd s/nt/nd  Msk: left posterior hip pain, femur non-tender, able to bear weight,  Neuro: CN2-12 intact, strength 5/5x4, sensation grossly intact  Skin: No rashes

## 2024-05-16 PROBLEM — Z78.9 OTHER SPECIFIED HEALTH STATUS: Chronic | Status: ACTIVE | Noted: 2024-02-20

## 2024-06-13 ENCOUNTER — EMERGENCY (EMERGENCY)
Facility: HOSPITAL | Age: 64
LOS: 0 days | Discharge: LEFT BEFORE DISCHARGE | End: 2024-06-13
Attending: EMERGENCY MEDICINE
Payer: MEDICARE

## 2024-06-13 VITALS
HEART RATE: 78 BPM | SYSTOLIC BLOOD PRESSURE: 115 MMHG | RESPIRATION RATE: 18 BRPM | DIASTOLIC BLOOD PRESSURE: 89 MMHG | OXYGEN SATURATION: 100 %

## 2024-06-13 VITALS
WEIGHT: 151.02 LBS | HEART RATE: 76 BPM | TEMPERATURE: 98 F | OXYGEN SATURATION: 97 % | RESPIRATION RATE: 16 BRPM | HEIGHT: 69 IN | SYSTOLIC BLOOD PRESSURE: 116 MMHG | DIASTOLIC BLOOD PRESSURE: 68 MMHG

## 2024-06-13 DIAGNOSIS — R00.2 PALPITATIONS: ICD-10-CM

## 2024-06-13 DIAGNOSIS — Z88.0 ALLERGY STATUS TO PENICILLIN: ICD-10-CM
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
## 2024-06-13 DIAGNOSIS — R55 SYNCOPE AND COLLAPSE: ICD-10-CM

## 2024-06-13 DIAGNOSIS — R53.81 OTHER MALAISE: ICD-10-CM

## 2024-06-13 DIAGNOSIS — Z53.29 PROCEDURE AND TREATMENT NOT CARRIED OUT BECAUSE OF PATIENT'S DECISION FOR OTHER REASONS: ICD-10-CM

## 2024-06-13 DIAGNOSIS — R78.0 FINDING OF ALCOHOL IN BLOOD: ICD-10-CM

## 2024-06-13 LAB
APTT BLD: 28.1 SEC — SIGNIFICANT CHANGE UP (ref 24.5–35.6)
BASOPHILS # BLD AUTO: 0.04 K/UL — SIGNIFICANT CHANGE UP (ref 0–0.2)
BASOPHILS NFR BLD AUTO: 0.6 % — SIGNIFICANT CHANGE UP (ref 0–2)
EOSINOPHIL # BLD AUTO: 0.17 K/UL — SIGNIFICANT CHANGE UP (ref 0–0.5)
EOSINOPHIL NFR BLD AUTO: 2.5 % — SIGNIFICANT CHANGE UP (ref 0–6)
ETHANOL SERPL-MCNC: 143 MG/DL — HIGH (ref 0–10)
HCT VFR BLD CALC: 40.4 % — SIGNIFICANT CHANGE UP (ref 39–50)
HGB BLD-MCNC: 13.4 G/DL — SIGNIFICANT CHANGE UP (ref 13–17)
IMM GRANULOCYTES NFR BLD AUTO: 0.1 % — SIGNIFICANT CHANGE UP (ref 0–0.9)
INR BLD: 0.99 RATIO — SIGNIFICANT CHANGE UP (ref 0.85–1.18)
LYMPHOCYTES # BLD AUTO: 2.4 K/UL — SIGNIFICANT CHANGE UP (ref 1–3.3)
LYMPHOCYTES # BLD AUTO: 35 % — SIGNIFICANT CHANGE UP (ref 13–44)
MCHC RBC-ENTMCNC: 29.6 PG — SIGNIFICANT CHANGE UP (ref 27–34)
MCHC RBC-ENTMCNC: 33.2 GM/DL — SIGNIFICANT CHANGE UP (ref 32–36)
MCV RBC AUTO: 89.4 FL — SIGNIFICANT CHANGE UP (ref 80–100)
MONOCYTES # BLD AUTO: 0.95 K/UL — HIGH (ref 0–0.9)
MONOCYTES NFR BLD AUTO: 13.8 % — SIGNIFICANT CHANGE UP (ref 2–14)
NEUTROPHILS # BLD AUTO: 3.29 K/UL — SIGNIFICANT CHANGE UP (ref 1.8–7.4)
NEUTROPHILS NFR BLD AUTO: 48 % — SIGNIFICANT CHANGE UP (ref 43–77)
PLATELET # BLD AUTO: 276 K/UL — SIGNIFICANT CHANGE UP (ref 150–400)
PROTHROM AB SERPL-ACNC: 11.2 SEC — SIGNIFICANT CHANGE UP (ref 9.5–13)
RBC # BLD: 4.52 M/UL — SIGNIFICANT CHANGE UP (ref 4.2–5.8)
RBC # FLD: 14.4 % — SIGNIFICANT CHANGE UP (ref 10.3–14.5)
WBC # BLD: 6.86 K/UL — SIGNIFICANT CHANGE UP (ref 3.8–10.5)
WBC # FLD AUTO: 6.86 K/UL — SIGNIFICANT CHANGE UP (ref 3.8–10.5)

## 2024-06-13 PROCEDURE — 85730 THROMBOPLASTIN TIME PARTIAL: CPT

## 2024-06-13 PROCEDURE — 99285 EMERGENCY DEPT VISIT HI MDM: CPT | Mod: 25

## 2024-06-13 PROCEDURE — 80307 DRUG TEST PRSMV CHEM ANLYZR: CPT

## 2024-06-13 PROCEDURE — 93010 ELECTROCARDIOGRAM REPORT: CPT | Mod: 76

## 2024-06-13 PROCEDURE — 36415 COLL VENOUS BLD VENIPUNCTURE: CPT

## 2024-06-13 PROCEDURE — 85025 COMPLETE CBC W/AUTO DIFF WBC: CPT

## 2024-06-13 PROCEDURE — 36000 PLACE NEEDLE IN VEIN: CPT

## 2024-06-13 PROCEDURE — 99285 EMERGENCY DEPT VISIT HI MDM: CPT

## 2024-06-13 PROCEDURE — 85610 PROTHROMBIN TIME: CPT

## 2024-06-13 PROCEDURE — 71045 X-RAY EXAM CHEST 1 VIEW: CPT

## 2024-06-13 PROCEDURE — 93005 ELECTROCARDIOGRAM TRACING: CPT

## 2024-06-13 PROCEDURE — 71045 X-RAY EXAM CHEST 1 VIEW: CPT | Mod: 26

## 2024-06-13 RX ORDER — SODIUM CHLORIDE 9 MG/ML
500 INJECTION INTRAMUSCULAR; INTRAVENOUS; SUBCUTANEOUS ONCE
Refills: 0 | Status: COMPLETED | OUTPATIENT
Start: 2024-06-13 | End: 2024-06-13

## 2024-06-13 RX ADMIN — SODIUM CHLORIDE 500 MILLILITER(S): 9 INJECTION INTRAMUSCULAR; INTRAVENOUS; SUBCUTANEOUS at 14:27

## 2024-06-13 NOTE — ED ADULT NURSE NOTE - CAS TRG GENERAL NORM CIRC DET
Strong peripheral pulses H/O colonoscopy  2013  H/O left knee surgery  TO CORRECT POLIO  History of lobectomy of lung  UPPER, RIGHT  History of tonsillectomy and adenoidectomy  CHILDHOOD  S/P appendectomy  CHILDHOOD

## 2024-06-13 NOTE — ED ADULT NURSE NOTE - OBJECTIVE STATEMENT
63 y/o male presenting to ER with c/o feeling like he was going ot faint this morning, lasted a few hours associated with fluttering in chest. pt has AICD but now is only a PPM. states he had some coffee upon arising this morning and then had a beer. was sitting outside in the sun. hx epilepsy. has stage 4 thyroid ca, supposed to be on medications but reports he is not taking them. pt states he does not want to be here. on cardiac monitor patient tracing NSR in the 70's. hx CHF, CVA. pt is prescribed Eliquis, but stopped taking them. pt reports he is domiciled in shelter. pt is now DNR/DNI.

## 2024-06-13 NOTE — ED PROVIDER NOTE - PROGRESS NOTE DETAILS
signed Sophia Brown PA-C   Pt wants to leave AMA. Dr Lew discussed AMA with pt and paper signed with RN Vu witness. Pt with elevated Etoh but he is clinically not intoxicated. Pt bright and alert, is capable of making informed medical decisions. Pt says he is sick of hospitals and tests and doesn't want to be uncomfortable. He wants to leave and go smoke a cigarette. Pt informed he may return at any time. Pt ambulates with walker at baseline.

## 2024-06-13 NOTE — ED ADULT TRIAGE NOTE - CHIEF COMPLAINT QUOTE
biba with c/o generalized malaise and heart palpitations. pt. denies chest pain and SOB. HX Thyroid CA and PPM

## 2024-06-13 NOTE — ED ADULT NURSE NOTE - NSFALLHARMRISKINTERV_ED_ALL_ED

## 2024-06-13 NOTE — ED PROVIDER NOTE - NSFOLLOWUPINSTRUCTIONS_ED_ALL_ED_FT
YOU ARE LEAVING THE HOSPITAL AGAINST MEDICAL ADVICE. FOLLOW UP WITH YOUR DOCTOR TOMORROW. YOU MAY RETURN TO THE ER AT ANY TIME FOR RE-EVALUATION OR ANY NEW CONCERNS.     Syncope    WHAT YOU NEED TO KNOW:    Syncope is also called fainting or passing out. Syncope is a sudden, temporary loss of consciousness, followed by a fall from a standing or sitting position. Syncope ranges from not serious to a sign of a more serious condition that needs to be treated. You can control some health conditions that cause syncope. Your healthcare providers can help you create a plan to manage syncope and prevent episodes.    DISCHARGE INSTRUCTIONS:    Seek care immediately if:     You are bleeding because you hit your head when you fainted.       You suddenly have double vision, difficulty speaking, numbness, and cannot move your arms or legs.      You have chest pain and trouble breathing.      You vomit blood or material that looks like coffee grounds.      You see blood in your bowel movement.    Contact your healthcare provider if:     You have new or worsening symptoms.      You have another syncope episode.      You have a headache, fast heartbeat, or feel too dizzy to stand up.      You have questions or concerns about your condition or care.    Medicines:     Medicines may be needed to help your heart pump strongly and regularly. Your healthcare provider may also make changes to any medicines that are causing syncope.       Take your medicine as directed. Contact your healthcare provider if you think your medicine is not helping or if you have side effects. Tell him or her if you are allergic to any medicine. Keep a list of the medicines, vitamins, and herbs you take. Include the amounts, and when and why you take them. Bring the list or the pill bottles to follow-up visits. Carry your medicine list with you in case of an emergency.    Follow up with your healthcare provider as directed: Write down your questions so you remember to ask them during your visits.     Manage syncope:     Keep a record of your syncope episodes. Include your symptoms and your activity before and after the episode. The record can help your healthcare provider find the cause of your syncope and help you manage episodes.      Sit or lie down when needed. This includes when you feel dizzy, your throat is getting tight, and your vision changes. Raise your legs above the level of your heart.      Take slow, deep breaths if you start to breathe faster with anxiety or fear. This can help decrease dizziness and the feeling that you might faint.       Check your blood pressure often. This is important if you take medicine to lower your blood pressure. Check your blood pressure when you are lying down and when you are standing. Ask how often to check during the day. Keep a record of your blood pressure numbers. Your healthcare provider may use the record to help plan your treatment.How to take a Blood Pressure         Prevent a syncope episode:     Move slowly and let yourself get used to one position before you move to another position. This is very important when you change from a lying or sitting position to a standing position. Take some deep breaths before you stand up from a lying position. Stand up slowly. Sudden movements may cause a fainting spell. Sit on the side of the bed or couch for a few minutes before you stand up. If you are on bedrest, try to be upright for about 2 hours each day, or as directed. Do not lock your legs if you are standing for a long period of time. Move your legs and bend your knees to keep blood flowing.      Follow your healthcare provider's recommendations. Your provider may recommend that you drink more liquids to prevent dehydration. You may also need to have more salt to keep your blood pressure from dropping too low and causing syncope. Your provider will tell you how much liquid and sodium to have each day. He or she will also tell you how much physical activity is safe for you. This will depend on what is causing your syncope.      Watch for signs of low blood sugar. These include hunger, nervousness, sweating, and fast or fluttery heartbeats. Talk with your healthcare provider about ways to keep your blood sugar level steady.      Do not strain if you are constipated. You may faint if you strain to have a bowel movement. Walking is the best way to get your bowels moving. Eat foods high in fiber to make it easier to have a bowel movement. Good examples are high-fiber cereals, beans, vegetables, and whole-grain breads. Prune juice may help make bowel movements softer.      Be careful in hot weather. Heat can cause a syncope episode. Limit activity done outside on hot days. Physical activity in hot weather can lead to dehydration. This can cause an episode.

## 2024-06-13 NOTE — ED PROVIDER NOTE - OBJECTIVE STATEMENT
64M c/o malaise and near syncope this morning. Pt says he felt unwell this morning, weak like he might pass out, some palpitations. Pt became emotional, upset, and tearful as he says he has stage IV cancer and that he spent a month in [facility] a few weeks ago because he "" and his heart only works at 25%. Pt says he had a beer this morning but not much else to eat or drink. Admits he may be dehydrated as it is hot outside and he was sitting in the sun this morning

## 2024-06-13 NOTE — ED PROVIDER NOTE - ATTENDING APP SHARED VISIT CONTRIBUTION OF CARE
I have seen the patient with the PRO and agree with above examination and assessment and plan with the following addendum:        Focused PE:   General: NAD, alert and oriented  Head: Normocephalic, atraumatic  Eyes: PERRLA, EOMI  Cardiac: RRR, no murmurs, rubs or gallops  Resp: CTA, no wheezes, rales or ronchi  GI: Nondistended, nontender, no rebound or guarding  MSK: FROM, no tenderness.   Neuro: Alert and oriented, no focal deficits.   Ext: Non edematous, nontender.

## 2024-06-13 NOTE — ED ADULT NURSE REASSESSMENT NOTE - NS ED NURSE REASSESS COMMENT FT1
Patient requesting to leave AMA. AMA form provided to patient and signed, witnessed by self and signed by attending ER Vipin. Patient verbalized understanding of risks of leaving AMA and is still requesting to leave. Unit secretary calling [facility] to arrange for transportation for patient back to shelter. IVL discontinued. Patient removed self from cardiac monitor and VS monitoring.

## 2024-06-13 NOTE — ED PROVIDER NOTE - PATIENT PORTAL LINK FT
You can access the FollowMyHealth Patient Portal offered by [facility] by registering at the following website: [email or web address]. By joining [employer]’s FollowMyHealth portal, you will also be able to view your health information using other applications (apps) compatible with our system.

## 2024-06-14 ENCOUNTER — EMERGENCY (EMERGENCY)
Facility: HOSPITAL | Age: 64
LOS: 0 days | Discharge: HOME | End: 2024-06-14
Attending: EMERGENCY MEDICINE
Payer: MEDICARE

## 2024-06-14 ENCOUNTER — APPOINTMENT (OUTPATIENT)
Dept: CARDIOLOGY | Facility: CLINIC | Age: 64
End: 2024-06-14
Payer: MEDICARE

## 2024-06-14 ENCOUNTER — NON-APPOINTMENT (OUTPATIENT)
Age: 64
End: 2024-06-14

## 2024-06-14 ENCOUNTER — APPOINTMENT (OUTPATIENT)
Dept: ELECTROPHYSIOLOGY | Facility: CLINIC | Age: 64
End: 2024-06-14
Payer: MEDICARE

## 2024-06-14 VITALS
DIASTOLIC BLOOD PRESSURE: 81 MMHG | RESPIRATION RATE: 18 BRPM | SYSTOLIC BLOOD PRESSURE: 158 MMHG | WEIGHT: 148.15 LBS | HEART RATE: 75 BPM | OXYGEN SATURATION: 98 % | TEMPERATURE: 98 F

## 2024-06-14 VITALS
SYSTOLIC BLOOD PRESSURE: 151 MMHG | HEART RATE: 72 BPM | DIASTOLIC BLOOD PRESSURE: 85 MMHG | WEIGHT: 151 LBS | OXYGEN SATURATION: 99 % | HEIGHT: 69 IN | BODY MASS INDEX: 22.36 KG/M2

## 2024-06-14 VITALS
HEART RATE: 70 BPM | BODY MASS INDEX: 22.36 KG/M2 | OXYGEN SATURATION: 95 % | DIASTOLIC BLOOD PRESSURE: 70 MMHG | RESPIRATION RATE: 16 BRPM | TEMPERATURE: 99.1 F | HEIGHT: 69 IN | SYSTOLIC BLOOD PRESSURE: 140 MMHG | WEIGHT: 151 LBS

## 2024-06-14 VITALS — HEIGHT: 69 IN

## 2024-06-14 DIAGNOSIS — I50.20 UNSPECIFIED SYSTOLIC (CONGESTIVE) HEART FAILURE: ICD-10-CM

## 2024-06-14 DIAGNOSIS — R06.02 SHORTNESS OF BREATH: ICD-10-CM

## 2024-06-14 DIAGNOSIS — T82.9XXA UNSPECIFIED COMPLICATION OF CARDIAC AND VASCULAR PROSTHETIC DEVICE, IMPLANT AND GRAFT, INITIAL ENCOUNTER: ICD-10-CM

## 2024-06-14 DIAGNOSIS — Z78.9 OTHER SPECIFIED HEALTH STATUS: ICD-10-CM

## 2024-06-14 DIAGNOSIS — Z88.0 ALLERGY STATUS TO PENICILLIN: ICD-10-CM

## 2024-06-14 DIAGNOSIS — I11.0 HYPERTENSIVE HEART DISEASE WITH HEART FAILURE: ICD-10-CM

## 2024-06-14 DIAGNOSIS — I42.8 OTHER CARDIOMYOPATHIES: ICD-10-CM

## 2024-06-14 DIAGNOSIS — Z95.810 PRESENCE OF AUTOMATIC (IMPLANTABLE) CARDIAC DEFIBRILLATOR: ICD-10-CM

## 2024-06-14 DIAGNOSIS — I47.20 VENTRICULAR TACHYCARDIA, UNSPECIFIED: ICD-10-CM

## 2024-06-14 DIAGNOSIS — C73 MALIGNANT NEOPLASM OF THYROID GLAND: ICD-10-CM

## 2024-06-14 LAB
ALBUMIN SERPL ELPH-MCNC: 3.8 G/DL — SIGNIFICANT CHANGE UP (ref 3.3–5)
ALP SERPL-CCNC: 68 U/L — SIGNIFICANT CHANGE UP (ref 40–120)
ALT FLD-CCNC: 11 U/L — LOW (ref 12–78)
ANION GAP SERPL CALC-SCNC: 7 MMOL/L — SIGNIFICANT CHANGE UP (ref 5–17)
APTT BLD: 31.2 SEC — SIGNIFICANT CHANGE UP (ref 24.5–35.6)
AST SERPL-CCNC: 19 U/L — SIGNIFICANT CHANGE UP (ref 15–37)
BASOPHILS # BLD AUTO: 0.05 K/UL — SIGNIFICANT CHANGE UP (ref 0–0.2)
BASOPHILS NFR BLD AUTO: 0.9 % — SIGNIFICANT CHANGE UP (ref 0–2)
BILIRUB SERPL-MCNC: 0.6 MG/DL — SIGNIFICANT CHANGE UP (ref 0.2–1.2)
BUN SERPL-MCNC: 10 MG/DL — SIGNIFICANT CHANGE UP (ref 7–23)
CALCIUM SERPL-MCNC: 9.3 MG/DL — SIGNIFICANT CHANGE UP (ref 8.5–10.1)
CHLORIDE SERPL-SCNC: 106 MMOL/L — SIGNIFICANT CHANGE UP (ref 96–108)
CO2 SERPL-SCNC: 25 MMOL/L — SIGNIFICANT CHANGE UP (ref 22–31)
CREAT SERPL-MCNC: 0.71 MG/DL — SIGNIFICANT CHANGE UP (ref 0.5–1.3)
EGFR: 102 ML/MIN/1.73M2 — SIGNIFICANT CHANGE UP
EOSINOPHIL # BLD AUTO: 0.13 K/UL — SIGNIFICANT CHANGE UP (ref 0–0.5)
EOSINOPHIL NFR BLD AUTO: 2.3 % — SIGNIFICANT CHANGE UP (ref 0–6)
GLUCOSE SERPL-MCNC: 88 MG/DL — SIGNIFICANT CHANGE UP (ref 70–99)
HCT VFR BLD CALC: 40.4 % — SIGNIFICANT CHANGE UP (ref 39–50)
HGB BLD-MCNC: 13.4 G/DL — SIGNIFICANT CHANGE UP (ref 13–17)
IMM GRANULOCYTES NFR BLD AUTO: 0.2 % — SIGNIFICANT CHANGE UP (ref 0–0.9)
INR BLD: 0.99 RATIO — SIGNIFICANT CHANGE UP (ref 0.85–1.18)
LYMPHOCYTES # BLD AUTO: 2.17 K/UL — SIGNIFICANT CHANGE UP (ref 1–3.3)
LYMPHOCYTES # BLD AUTO: 38.1 % — SIGNIFICANT CHANGE UP (ref 13–44)
MAGNESIUM SERPL-MCNC: 1.9 MG/DL — SIGNIFICANT CHANGE UP (ref 1.6–2.6)
MCHC RBC-ENTMCNC: 29.6 PG — SIGNIFICANT CHANGE UP (ref 27–34)
MCHC RBC-ENTMCNC: 33.2 GM/DL — SIGNIFICANT CHANGE UP (ref 32–36)
MCV RBC AUTO: 89.4 FL — SIGNIFICANT CHANGE UP (ref 80–100)
MONOCYTES # BLD AUTO: 0.88 K/UL — SIGNIFICANT CHANGE UP (ref 0–0.9)
MONOCYTES NFR BLD AUTO: 15.4 % — HIGH (ref 2–14)
NEUTROPHILS # BLD AUTO: 2.46 K/UL — SIGNIFICANT CHANGE UP (ref 1.8–7.4)
NEUTROPHILS NFR BLD AUTO: 43.1 % — SIGNIFICANT CHANGE UP (ref 43–77)
PHOSPHATE SERPL-MCNC: 2.6 MG/DL — SIGNIFICANT CHANGE UP (ref 2.5–4.5)
PLATELET # BLD AUTO: 279 K/UL — SIGNIFICANT CHANGE UP (ref 150–400)
POTASSIUM SERPL-MCNC: 3.6 MMOL/L — SIGNIFICANT CHANGE UP (ref 3.5–5.3)
POTASSIUM SERPL-SCNC: 3.6 MMOL/L — SIGNIFICANT CHANGE UP (ref 3.5–5.3)
PROT SERPL-MCNC: 7.7 GM/DL — SIGNIFICANT CHANGE UP (ref 6–8.3)
PROTHROM AB SERPL-ACNC: 11.2 SEC — SIGNIFICANT CHANGE UP (ref 9.5–13)
RBC # BLD: 4.52 M/UL — SIGNIFICANT CHANGE UP (ref 4.2–5.8)
RBC # FLD: 14.4 % — SIGNIFICANT CHANGE UP (ref 10.3–14.5)
SODIUM SERPL-SCNC: 138 MMOL/L — SIGNIFICANT CHANGE UP (ref 135–145)
TROPONIN I, HIGH SENSITIVITY RESULT: 7.49 NG/L — SIGNIFICANT CHANGE UP
WBC # BLD: 5.7 K/UL — SIGNIFICANT CHANGE UP (ref 3.8–10.5)
WBC # FLD AUTO: 5.7 K/UL — SIGNIFICANT CHANGE UP (ref 3.8–10.5)

## 2024-06-14 PROCEDURE — 83735 ASSAY OF MAGNESIUM: CPT

## 2024-06-14 PROCEDURE — 99214 OFFICE O/P EST MOD 30 MIN: CPT

## 2024-06-14 PROCEDURE — 93005 ELECTROCARDIOGRAM TRACING: CPT

## 2024-06-14 PROCEDURE — 93283 PRGRMG EVAL IMPLANTABLE DFB: CPT

## 2024-06-14 PROCEDURE — G2211 COMPLEX E/M VISIT ADD ON: CPT

## 2024-06-14 PROCEDURE — 85025 COMPLETE CBC W/AUTO DIFF WBC: CPT

## 2024-06-14 PROCEDURE — 84484 ASSAY OF TROPONIN QUANT: CPT

## 2024-06-14 PROCEDURE — 71046 X-RAY EXAM CHEST 2 VIEWS: CPT

## 2024-06-14 PROCEDURE — 85610 PROTHROMBIN TIME: CPT

## 2024-06-14 PROCEDURE — 93000 ELECTROCARDIOGRAM COMPLETE: CPT

## 2024-06-14 PROCEDURE — 80053 COMPREHEN METABOLIC PANEL: CPT

## 2024-06-14 PROCEDURE — 93010 ELECTROCARDIOGRAM REPORT: CPT

## 2024-06-14 PROCEDURE — 71046 X-RAY EXAM CHEST 2 VIEWS: CPT | Mod: 26

## 2024-06-14 PROCEDURE — 99215 OFFICE O/P EST HI 40 MIN: CPT

## 2024-06-14 PROCEDURE — 99285 EMERGENCY DEPT VISIT HI MDM: CPT | Mod: 25

## 2024-06-14 PROCEDURE — 36415 COLL VENOUS BLD VENIPUNCTURE: CPT

## 2024-06-14 PROCEDURE — 36000 PLACE NEEDLE IN VEIN: CPT

## 2024-06-14 PROCEDURE — 99285 EMERGENCY DEPT VISIT HI MDM: CPT

## 2024-06-14 PROCEDURE — 84100 ASSAY OF PHOSPHORUS: CPT

## 2024-06-14 PROCEDURE — 85730 THROMBOPLASTIN TIME PARTIAL: CPT

## 2024-06-14 PROCEDURE — 93290 INTERROG DEV EVAL ICPMS IP: CPT | Mod: 26

## 2024-06-14 RX ORDER — METOPROLOL SUCCINATE 25 MG/1
25 TABLET, EXTENDED RELEASE ORAL
Qty: 30 | Refills: 3 | Status: ACTIVE | COMMUNITY
Start: 2024-06-14 | End: 1900-01-01

## 2024-06-14 NOTE — ASSESSMENT
[FreeTextEntry1] : A/P:  *KRrZO-[id number]-TC=<35% *s/p ICD *thyroid cancer  -I discussed risk of sudden cardiac death if ICD is turned off. he now is willing to have therapies turned back on. -I spoke w/ Dr. Elie SORIANO who agreed to evaluate the patient interrogate the ICD & if appropriate turn on ICD therapies. -records from [facility] & [facility] in [place] requested -return in 1 week  -also advised pt to followup w/ Oncology re: his reported history of thyroid cancer.  -will not start medicatioons at this time until I receive prior records & after discussion w/ pt risk & benefits of therapies.  ====================== ====================== Addendum:  Dr. Elie SORIANO contacted me & reported that pt has a Medtronic device.  ICD therapies were still on, but the detection was prolonged & a VT zone was added  with 6 runs of ATP & discriminators were activated. Pt confirmed he wished to continue to have therapies active. has 2.8 years battery life.  No VT episodes since his last ICD shock.  metoprolol started.

## 2024-06-14 NOTE — ASSESSMENT
[FreeTextEntry1] : A/P:  *IRzIH-[id number]-OU=<35% *s/p ICD *thyroid cancer  -I discussed risk of sudden cardiac death if ICD is turned off. he now is willing to have therapies turned back on. -I spoke w/ Dr. Elie SORIANO who agreed to evaluate the patient interrogate the ICD & if appropriate turn on ICD therapies. -records from [facility] & [facility] in [place] requested -return in 1 week  -also advised pt to followup w/ Oncology re: his reported history of thyroid cancer.  -will not start medicatioons at this time until I receive prior records & after discussion w/ pt risk & benefits of therapies.  ====================== ====================== Addendum:  Dr. Elie SORIANO contacted me & reported that pt has a Medtronic device.  ICD therapies were still on, but the detection was prolonged & a VT zone was added  with 6 runs of ATP & discriminators were activated. Pt confirmed he wished to continue to have therapies active. has 2.8 years battery life.  No VT episodes since his last ICD shock.  metoprolol started.

## 2024-06-14 NOTE — ED STATDOCS - ATTENDING APP SHARED VISIT CONTRIBUTION OF CARE
I, Lul Peguero MD,  performed the initial face to face bedside interview with this patient regarding history of present illness, review of symptoms and relevant past medical, social and family history.  I completed an independent physical examination.  I was the initial provider who evaluated this patient.   I personally saw the patient and performed a substantive portion of the visit including all aspects of the medical decision making.  I have signed out the follow up of any pending tests (i.e. labs, radiological studies) to the PRO.  I have communicated the patient’s plan of care and disposition with the PRO.  The history, relevant review of systems, past medical and surgical history, medical decision making, and physical examination was documented by the scribe in my presence and I attest to the accuracy of the documentation.

## 2024-06-14 NOTE — HISTORY OF PRESENT ILLNESS
[FreeTextEntry1] : Mr. Marmolejo presents for ICD check. He is a 65 y/o male with a h/o Dilated cardiomyopathy.  He experienced an ICD shock on April 6, 2024. He was sitting watching TV emotionally distraught when he experienced sudden ICD shock. States it felt like a firecracker going off in his chest.  He went to an ED had the device interrogated and was told it was turned off.  He is a poor historian but device log documents a nonischemic dilated cardiomyopathy with an EF15%.  currently, MAILE MARMOLEJO  denies chest pain, chest pressure, shortness of breath, lightheadedness, dizziness, palpitations, syncope, presyncope, orthopnea, PND, or edema.

## 2024-06-14 NOTE — ED STATDOCS - CLINICAL SUMMARY MEDICAL DECISION MAKING FREE TEXT BOX
Pt presents to the ED sent by Dr. Jauregui for possible adjustment of pacemaker, plan for labs and consult Dr. Jauregui.

## 2024-06-14 NOTE — ED ADULT TRIAGE NOTE - CHIEF COMPLAINT QUOTE
Pt. presents to ED sent by Dr Herrera to have his pacemaker "turned on". Pt. denies chest pain/SOB. No complaints at this time

## 2024-06-14 NOTE — HISTORY OF PRESENT ILLNESS
[FreeTextEntry1] : 65 y/o here to establish care.  pt is a suboptimal historian.  He was referred by his PCP for management of cardiac disease. He reports a h/o congestive heart failure s/p ICD implant. CHF was diagnosed at [facility] in  then he moved to [facility].  He states he then had ICD implant at [facility] by Dr. Faizan Miller.  He was seen by Dr. Miller after that while living in [place] for cardiovascular care.  Last November in [place] was diagnosed w/ Thyroid Cancer Was offered treatment for this but pt declined (did not want to be "experimented on").  He also reports h/o prostate Ca '13 had chemotherapy & had neuropathy as a result.  He was  told he has 2 years to live.  shortly after he moved back to [place].  He was admitted about 2-3 weeks ago to [facility] b/c defibrillator fired. He states he told the physicians during that admission to  turn off his ICD therapies b/c he did not want to be shocked again & they turned it off. He was told his heart function was 25%.  He has not seen any oncologists here in [place].  He is not taking any medications at this time.  He also reports a h/o "blood clots" in R leg was supposed to take eliquis in the past.  High burden of bloods in leg per his description & he states they performed surgery to extract the clot.  he has 2 daughters. he lives in a homeless shelter.  ECG NSR LVH no ischemic changes.  ECG NSR no ischemic changes. =========================== =========================== =========================== [facility]  EMERGENCY DEPARTMENT NOTES:  MAILE MARMOLEJO Male 1960 64 Yrs                                          	 ED Provider Note    	Shrink    	Status	Incomplete		Document Date	2024 15:35    	Facility	[facility]		Encounter Date	2024 13:33    	Clinician	Sophia Brown		Last Update Date	2024 15:35    	Doc Type   	Acute Note		Finalized Date	   HISTORY OF PRESENT ILLNESS:  Chief Complaint: palpitations.  - Chief Complaint: The patient is a 64y Male complaining of palpitations. - HPI Objective Statement: 64M c/o malaise and near syncope this morning. Pt says he felt unwell this morning, weak like he might pass out, some palpitations. Pt became emotional, upset, and tearful as he says he has stage IV cancer and that he spent a month in [place] a few weeks ago because he "" and his heart only works at 25%. Pt says he had a beer this morning but not much else to eat or drink. Admits he may be dehydrated as it is hot outside and he was sitting in the sun this morning ..................................  PROGRESS NOTE: Date: 2024 15:38.  Progress: signed Sophia Brown PA-C Pt wants to leave AMA. Dr Lew discussed AMA with pt and paper signed with RN Vu witness. Pt with elevated Etoh but he is clinically not intoxicated. Pt bright and alert, is capable of making informed medical decisions. Pt says he is sick of hospitals and tests and doesn't want to be uncomfortable. He wants to leave and go smoke a cigarette. Pt informed he may return at any time. Pt ambulates with walker at baseline.  DISPOSITION: Care Plan - Instructions: Principal Discharge DX: Near syncope. ============================================= MAILE MARMOLEJO Male 1960 64 Yrs                                          	 ED Provider Note    	Shrink    	Status	Complete: Signed		Document Date	2024 22:33    	Facility	[facility]		Encounter Date	2024 21:22    	Clinician	Unknown, Emergency Doctor		Last Update Date	2024 22:33    	Doc Type   	Acute Note		Finalized Date	2024 22:45    	 Wrap Text: Off  HISTORY OF PRESENT ILLNESS: International Travel: International Travel within 21 days? No.(1)  Patient Identity: - Birth Sex Male  Child Abuse Assessment (patients less than 13 yrs): Chief Complaint: medical evaluation.  - Chief Complaint: The patient is a 63y Male complaining of medical evaluation. - HPI Objective Statement: 63 year old male with PMHx thyroid cancer (not actively being treated, on hospice), DVTs on eliquis, HTN, Medtronic pacemaker placement 2019 in [place], seen earlier in the ED for "pacemaker firing", but left AMA before labs returned, presents from [facility] shelter for housing issues because [facility] cannot accept him back if he signed out AMA. No medical complaints today. Pt states he has housing on Thursday. ...................... Impression: 1.  Principal Discharge Dx Follow-up medical care requested by patient.  Medical Decision Making: - I independently interpreted the following studies Yes - Interpreted by: Tiffanie Lloyd - Studies EKG - see results section for interpretation, Xray Image(s) - see wet read section for interpretation - The following orders were submitted: Imaging Studies - Clinical Summary (MDM): Summarize the clinical encounter Tiffanie Lloyd MD, Attending plan: review earlier labs and workup, EKG, CXR, DC  pacemaker interrogation earlier today (copied from earlier provider note) At that ED visit the Medtronic PM interrogated, report per [employer] Transmission Note: "No device or lead performance issues observed. DEVICE ASSESSMENT: Available daily battery/lead measurements within expected range. Lead trends stable. ARRYTHMIA SUMMARY: Since data last cleared on 17-Aug-2023, Based on programmed zones, device detected/treated: 4 monitored VT-NS episodes most recent on 2024. - Follow-up Instructions (will be supplied to the patient only if discharged) ** Follow up with your primary care doctor in the next 72 hours. ** Go to the nearest Emergency Department if you experience any new or concerning symptoms, such as: - worsening pain - chest pain - difficulty breathing - passing out - unable to eat or drink - unable to move or feel part of your body - fever, chills  Disposition: Disposition: DISCHARGE.   Principal Discharge Dx Near syncope. ============================ : [id number]     EXAM:  XR CHEST PORTABLE URGENT 1V   ORDERED BY: PAYTON LAY  PROCEDURE DATE:  2024    INTERPRETATION:  Portable AP chest radiographs  COMPARISON: 2024 chest x-ray at 5:41 PM.  CLINICAL INFORMATION: Chest Pain.  FINDINGS: CATHETERS AND TUBES: None  PULMONARY: The airway is midline. There are no airspace consolidations or radiographic evidence of pulmonary nodules.. No pleural effusion or pneumothorax.  HEART/VASCULAR: The heart size and mediastinum configuration are within the limits of normal. Cardiac device wire leads are within right atrium and right ventricle.  BONES: The visualized osseous thorax is intact.  IMPRESSION:  No radiographic evidence of active chest disease..  --- End of Report ---      PAULINE BATRES MD; Attending Radiologist This document has been electronically signed. 2024  9:44AM

## 2024-06-14 NOTE — HISTORY OF PRESENT ILLNESS
[FreeTextEntry1] : 63 y/o here to establish care.  pt is a suboptimal historian.  He was referred by his PCP for management of cardiac disease. He reports a h/o congestive heart failure s/p ICD implant. CHF was diagnosed at [facility] in  then he moved to [facility].  He states he then had ICD implant at [facility] by Dr. Faizan Miller.  He was seen by Dr. Miller after that while living in [place] for cardiovascular care.  Last November in [place] was diagnosed w/ Thyroid Cancer Was offered treatment for this but pt declined (did not want to be "experimented on").  He also reports h/o prostate Ca '13 had chemotherapy & had neuropathy as a result.  He was  told he has 2 years to live.  shortly after he moved back to [place].  He was admitted about 2-3 weeks ago to [facility] b/c defibrillator fired. He states he told the physicians during that admission to  turn off his ICD therapies b/c he did not want to be shocked again & they turned it off. He was told his heart function was 25%.  He has not seen any oncologists here in [place].  He is not taking any medications at this time.  He also reports a h/o "blood clots" in R leg was supposed to take eliquis in the past.  High burden of bloods in leg per his description & he states they performed surgery to extract the clot.  he has 2 daughters. he lives in a homeless shelter.  ECG NSR LVH no ischemic changes.  ECG NSR no ischemic changes. =========================== =========================== =========================== [facility]  EMERGENCY DEPARTMENT NOTES:  MAILE MARMOLEJO Male 1960 64 Yrs                                          	 ED Provider Note    	Shrink    	Status	Incomplete		Document Date	2024 15:35    	Facility	[facility]		Encounter Date	2024 13:33    	Clinician	Sophia Brown		Last Update Date	2024 15:35    	Doc Type   	Acute Note		Finalized Date	   HISTORY OF PRESENT ILLNESS:  Chief Complaint: palpitations.  - Chief Complaint: The patient is a 64y Male complaining of palpitations. - HPI Objective Statement: 64M c/o malaise and near syncope this morning. Pt says he felt unwell this morning, weak like he might pass out, some palpitations. Pt became emotional, upset, and tearful as he says he has stage IV cancer and that he spent a month in [place] a few weeks ago because he "" and his heart only works at 25%. Pt says he had a beer this morning but not much else to eat or drink. Admits he may be dehydrated as it is hot outside and he was sitting in the sun this morning ..................................  PROGRESS NOTE: Date: 2024 15:38.  Progress: signed Sophia Brown PA-C Pt wants to leave AMA. Dr Lew discussed AMA with pt and paper signed with RN Vu witness. Pt with elevated Etoh but he is clinically not intoxicated. Pt bright and alert, is capable of making informed medical decisions. Pt says he is sick of hospitals and tests and doesn't want to be uncomfortable. He wants to leave and go smoke a cigarette. Pt informed he may return at any time. Pt ambulates with walker at baseline.  DISPOSITION: Care Plan - Instructions: Principal Discharge DX: Near syncope. ============================================= MAILE MARMOLEJO Male 1960 64 Yrs                                          	 ED Provider Note    	Shrink    	Status	Complete: Signed		Document Date	2024 22:33    	Facility	[facility]		Encounter Date	2024 21:22    	Clinician	Unknown, Emergency Doctor		Last Update Date	2024 22:33    	Doc Type   	Acute Note		Finalized Date	2024 22:45    	 Wrap Text: Off  HISTORY OF PRESENT ILLNESS: International Travel: International Travel within 21 days? No.(1)  Patient Identity: - Birth Sex Male  Child Abuse Assessment (patients less than 13 yrs): Chief Complaint: medical evaluation.  - Chief Complaint: The patient is a 63y Male complaining of medical evaluation. - HPI Objective Statement: 63 year old male with PMHx thyroid cancer (not actively being treated, on hospice), DVTs on eliquis, HTN, Medtronic pacemaker placement 2019 in [place], seen earlier in the ED for "pacemaker firing", but left AMA before labs returned, presents from [facility] shelter for housing issues because [facility] cannot accept him back if he signed out AMA. No medical complaints today. Pt states he has housing on Thursday. ...................... Impression: 1.  Principal Discharge Dx Follow-up medical care requested by patient.  Medical Decision Making: - I independently interpreted the following studies Yes - Interpreted by: Tiffanie Lloyd - Studies EKG - see results section for interpretation, Xray Image(s) - see wet read section for interpretation - The following orders were submitted: Imaging Studies - Clinical Summary (MDM): Summarize the clinical encounter Tiffanie Lloyd MD, Attending plan: review earlier labs and workup, EKG, CXR, DC  pacemaker interrogation earlier today (copied from earlier provider note) At that ED visit the Medtronic PM interrogated, report per [employer] Transmission Note: "No device or lead performance issues observed. DEVICE ASSESSMENT: Available daily battery/lead measurements within expected range. Lead trends stable. ARRYTHMIA SUMMARY: Since data last cleared on 17-Aug-2023, Based on programmed zones, device detected/treated: 4 monitored VT-NS episodes most recent on 2024. - Follow-up Instructions (will be supplied to the patient only if discharged) ** Follow up with your primary care doctor in the next 72 hours. ** Go to the nearest Emergency Department if you experience any new or concerning symptoms, such as: - worsening pain - chest pain - difficulty breathing - passing out - unable to eat or drink - unable to move or feel part of your body - fever, chills  Disposition: Disposition: DISCHARGE.   Principal Discharge Dx Near syncope. ============================ : [id number]     EXAM:  XR CHEST PORTABLE URGENT 1V   ORDERED BY: PAYTON LAY  PROCEDURE DATE:  2024    INTERPRETATION:  Portable AP chest radiographs  COMPARISON: 2024 chest x-ray at 5:41 PM.  CLINICAL INFORMATION: Chest Pain.  FINDINGS: CATHETERS AND TUBES: None  PULMONARY: The airway is midline. There are no airspace consolidations or radiographic evidence of pulmonary nodules.. No pleural effusion or pneumothorax.  HEART/VASCULAR: The heart size and mediastinum configuration are within the limits of normal. Cardiac device wire leads are within right atrium and right ventricle.  BONES: The visualized osseous thorax is intact.  IMPRESSION:  No radiographic evidence of active chest disease..  --- End of Report ---      PAULINE BATRES MD; Attending Radiologist This document has been electronically signed. 2024  9:44AM

## 2024-06-14 NOTE — ASSESSMENT
[FreeTextEntry1] : Mr. Macias presents for ICD check. He is a 65 y/o male with a h/o Dilated cardiomyopathy.  He experienced an ICD shock on April 6, 2024. He was sitting watching TV emotionally distraught when he experienced sudden ICD shock. States it felt like a firecracker going off in his chest.  He went to an ED had the device interrogated and was told it was turned off.  Interrogation reveals that the therapies are still active but detects were prolonged and a VT zone added with 6 runs ATP and discriminators activated.  Had a discussion with Mr Macias and he wishes to continue to have therapies active.  2.8 years battery longevity. No arrhythmia since VT episode that resulted in shock.   Recommend he start low dose beta blocker. Will need heart failure medications as per general cardiology.   Case discussed with Dr. Herrera and available records reviewed.

## 2024-06-14 NOTE — ED STATDOCS - PATIENT PORTAL LINK FT
You can access the FollowMyHealth Patient Portal offered by [facility] by registering at the following website: [email or web address]. By joining [employer]’s FollowMyHealth portal, you will also be able to view your health information using other applications (apps) compatible with our system.

## 2024-06-14 NOTE — ED STATDOCS - PROGRESS NOTE DETAILS
pt to be discharged to EP office to turn pacemaker on  Rianna Barnes PA-C Patient seen and evaluated, ED attending note and orders reviewed, will continue with patient follow up and care -Rianna Barnes PA-C ED Attending Dr. Kang, d/w Dr. Hill - Patient with recent admission to outside hospital and was found to have metastatic cancer.  At that time patient decided to turn off defibrillator.  Patient now has had a change of heart and would like defibrillator to be turned on.  Patient can go to  EP office for evaluation

## 2024-06-14 NOTE — ED STATDOCS - OBJECTIVE STATEMENT
65 y/o male with PMHx of HFrEF s/p AICD, stage IV thyroid cancer, HTN, neuropathy presents to the ED sent by Dr. Jauregui to have pacemaker "turned on". Per chart review pt was in ED yesterday for palpitations. Endorsing minimal chest pain and SOB at this time.

## 2024-06-20 ENCOUNTER — APPOINTMENT (OUTPATIENT)
Dept: CARDIOLOGY | Facility: CLINIC | Age: 64
End: 2024-06-20
Payer: MEDICARE

## 2024-06-20 VITALS
BODY MASS INDEX: 22.36 KG/M2 | HEIGHT: 69 IN | WEIGHT: 151 LBS | DIASTOLIC BLOOD PRESSURE: 83 MMHG | RESPIRATION RATE: 16 BRPM | HEART RATE: 65 BPM | OXYGEN SATURATION: 100 % | SYSTOLIC BLOOD PRESSURE: 137 MMHG

## 2024-06-20 PROCEDURE — 99215 OFFICE O/P EST HI 40 MIN: CPT

## 2024-06-20 PROCEDURE — G2211 COMPLEX E/M VISIT ADD ON: CPT

## 2024-06-20 RX ORDER — GABAPENTIN 300 MG/1
300 CAPSULE ORAL
Refills: 0 | Status: ACTIVE | COMMUNITY

## 2024-06-20 RX ORDER — SACUBITRIL AND VALSARTAN 24; 26 MG/1; MG/1
24-26 TABLET, FILM COATED ORAL TWICE DAILY
Qty: 60 | Refills: 3 | Status: ACTIVE | COMMUNITY
Start: 2024-06-20 | End: 1900-01-01

## 2024-06-20 NOTE — HISTORY OF PRESENT ILLNESS
[FreeTextEntry1] : [id number]  MAILE MARMOLEJO  Mar 13 1960 [phone]   63 y/o  *BRtCQ-emyrwmhefea-US=<35% *s/p ICD *thyroid cancer  here for followup. last visit referred to EP for device interrogation. contrary to pt's description, pt was not deactivated he confirmed his wish to keep ICD on. VT zone added to reduce chances of ICD shock.  confirmed pt is compliant w/ metoprolol succinate 25 mg daily  Records requested last visit, none received. he has an apt w/ [facility] Jun 25th.  Diego Hardy MD, Cardiovascular Disease [phone]  Reviewed HIE labs: Jun-14-'24: Hb 13, plat 279, Cr 0.71, ETOH 143 (0-10)  TESTING: ECG NSR LVH no ischemic changes.

## 2024-06-20 NOTE — ASSESSMENT
[FreeTextEntry1] : A/P:  *SBqOF-ewrovknzngs-OU=<35% -Cont. metoprolol succinate 12.5 mg daily -Started entresto 24-26 BID today. -records from [facility] & [place] request again. -Return in 3 weeks w/ basic chem prior to visit as well as [facility] records where echo 1 month ago was done if no reocrds received will consider ordering an echo.  *s/p ICD -cont. routinue EP followup.  *thyroid cancer -strongly advised reestablishing care w/ an oncologist to determine treatment options & prognosis he confirms he has apt w/ Oncology as noted above.  Return 3 weeks.

## 2024-07-11 ENCOUNTER — APPOINTMENT (OUTPATIENT)
Dept: CARDIOLOGY | Facility: CLINIC | Age: 64
End: 2024-07-11

## 2024-08-13 ENCOUNTER — APPOINTMENT (OUTPATIENT)
Dept: NEUROLOGY | Facility: CLINIC | Age: 64
End: 2024-08-13

## 2024-08-23 ENCOUNTER — APPOINTMENT (OUTPATIENT)
Dept: INTERNAL MEDICINE | Facility: CLINIC | Age: 64
End: 2024-08-23
Payer: MEDICARE

## 2024-08-23 ENCOUNTER — LABORATORY RESULT (OUTPATIENT)
Age: 64
End: 2024-08-23

## 2024-08-23 VITALS
SYSTOLIC BLOOD PRESSURE: 122 MMHG | TEMPERATURE: 97.7 F | WEIGHT: 151 LBS | HEIGHT: 69 IN | DIASTOLIC BLOOD PRESSURE: 73 MMHG | HEART RATE: 59 BPM | BODY MASS INDEX: 22.36 KG/M2 | OXYGEN SATURATION: 100 %

## 2024-08-23 DIAGNOSIS — C18.9 MALIGNANT NEOPLASM OF COLON, UNSPECIFIED: ICD-10-CM

## 2024-08-23 DIAGNOSIS — Z82.49 FAMILY HISTORY OF ISCHEMIC HEART DISEASE AND OTHER DISEASES OF THE CIRCULATORY SYSTEM: ICD-10-CM

## 2024-08-23 DIAGNOSIS — I42.8 OTHER CARDIOMYOPATHIES: ICD-10-CM

## 2024-08-23 DIAGNOSIS — I47.20 VENTRICULAR TACHYCARDIA, UNSPECIFIED: ICD-10-CM

## 2024-08-23 DIAGNOSIS — Z78.9 OTHER SPECIFIED HEALTH STATUS: ICD-10-CM

## 2024-08-23 DIAGNOSIS — Z80.9 FAMILY HISTORY OF MALIGNANT NEOPLASM, UNSPECIFIED: ICD-10-CM

## 2024-08-23 DIAGNOSIS — F17.200 NICOTINE DEPENDENCE, UNSPECIFIED, UNCOMPLICATED: ICD-10-CM

## 2024-08-23 DIAGNOSIS — R32 UNSPECIFIED URINARY INCONTINENCE: ICD-10-CM

## 2024-08-23 DIAGNOSIS — I82.419 ACUTE EMBOLISM AND THROMBOSIS OF UNSPECIFIED FEMORAL VEIN: ICD-10-CM

## 2024-08-23 DIAGNOSIS — C61 MALIGNANT NEOPLASM OF PROSTATE: ICD-10-CM

## 2024-08-23 DIAGNOSIS — C73 MALIGNANT NEOPLASM OF THYROID GLAND: ICD-10-CM

## 2024-08-23 DIAGNOSIS — Z85.038 PERSONAL HISTORY OF OTHER MALIGNANT NEOPLASM OF LARGE INTESTINE: ICD-10-CM

## 2024-08-23 DIAGNOSIS — Z85.46 PERSONAL HISTORY OF MALIGNANT NEOPLASM OF PROSTATE: ICD-10-CM

## 2024-08-23 PROCEDURE — 36415 COLL VENOUS BLD VENIPUNCTURE: CPT

## 2024-08-23 PROCEDURE — G2211 COMPLEX E/M VISIT ADD ON: CPT

## 2024-08-23 PROCEDURE — 99205 OFFICE O/P NEW HI 60 MIN: CPT

## 2024-08-24 LAB
25(OH)D3 SERPL-MCNC: 38.7 NG/ML
ALBUMIN SERPL ELPH-MCNC: 4.6 G/DL
ALP BLD-CCNC: 73 U/L
ALT SERPL-CCNC: 16 U/L
ANION GAP SERPL CALC-SCNC: 16 MMOL/L
AST SERPL-CCNC: 22 U/L
BASOPHILS # BLD AUTO: 0.08 K/UL
BASOPHILS NFR BLD AUTO: 1.2 %
BILIRUB SERPL-MCNC: 0.2 MG/DL
BUN SERPL-MCNC: 13 MG/DL
CALCIUM SERPL-MCNC: 9.4 MG/DL
CHLORIDE SERPL-SCNC: 102 MMOL/L
CHOLEST SERPL-MCNC: 179 MG/DL
CO2 SERPL-SCNC: 21 MMOL/L
CREAT SERPL-MCNC: 0.73 MG/DL
EGFR: 102 ML/MIN/1.73M2
EOSINOPHIL # BLD AUTO: 0.34 K/UL
EOSINOPHIL NFR BLD AUTO: 4.9 %
ESTIMATED AVERAGE GLUCOSE: 114 MG/DL
GLUCOSE SERPL-MCNC: 68 MG/DL
HBA1C MFR BLD HPLC: 5.6 %
HCT VFR BLD CALC: 42.2 %
HDLC SERPL-MCNC: 81 MG/DL
HGB BLD-MCNC: 13 G/DL
IMM GRANULOCYTES NFR BLD AUTO: 0.1 %
LDLC SERPL CALC-MCNC: 73 MG/DL
LYMPHOCYTES # BLD AUTO: 2.35 K/UL
LYMPHOCYTES NFR BLD AUTO: 34.1 %
MAN DIFF?: NORMAL
MCHC RBC-ENTMCNC: 28.8 PG
MCHC RBC-ENTMCNC: 30.8 GM/DL
MCV RBC AUTO: 93.4 FL
MONOCYTES # BLD AUTO: 0.67 K/UL
MONOCYTES NFR BLD AUTO: 9.7 %
NEUTROPHILS # BLD AUTO: 3.44 K/UL
NEUTROPHILS NFR BLD AUTO: 50 %
NONHDLC SERPL-MCNC: 98 MG/DL
PLATELET # BLD AUTO: 360 K/UL
POTASSIUM SERPL-SCNC: 4.2 MMOL/L
PROT SERPL-MCNC: 7.4 G/DL
PSA SERPL-MCNC: <0.01 NG/ML
RBC # BLD: 4.52 M/UL
RBC # FLD: 14.8 %
SODIUM SERPL-SCNC: 139 MMOL/L
TRIGL SERPL-MCNC: 148 MG/DL
TSH SERPL-ACNC: 0.08 UIU/ML
VIT B12 SERPL-MCNC: 249 PG/ML
WBC # FLD AUTO: 6.89 K/UL

## 2024-08-25 RX ORDER — APIXABAN 5 MG/1
5 TABLET, FILM COATED ORAL
Refills: 0 | Status: ACTIVE | COMMUNITY

## 2024-08-25 RX ORDER — MULTIVIT-MIN/IRON/FOLIC ACID/K 18-600-40
400 CAPSULE ORAL
Refills: 0 | Status: ACTIVE | COMMUNITY

## 2024-08-25 RX ORDER — GABAPENTIN 300 MG
300 TABLET ORAL
Refills: 0 | Status: ACTIVE | COMMUNITY

## 2024-08-25 RX ORDER — AMLODIPINE BESYLATE 5 MG/1
TABLET ORAL
Refills: 0 | Status: ACTIVE | COMMUNITY

## 2024-08-25 RX ORDER — CARVEDILOL 6.25 MG/1
6.25 TABLET, FILM COATED ORAL
Refills: 0 | Status: ACTIVE | COMMUNITY

## 2024-08-25 RX ORDER — LISINOPRIL 5 MG/1
5 TABLET ORAL
Refills: 0 | Status: ACTIVE | COMMUNITY

## 2024-08-25 RX ORDER — ATORVASTATIN CALCIUM 40 MG/1
40 TABLET, FILM COATED ORAL
Refills: 0 | Status: ACTIVE | COMMUNITY

## 2024-08-26 NOTE — PHYSICAL EXAM
[No Acute Distress] : no acute distress [No JVD] : no jugular venous distention [No Respiratory Distress] : no respiratory distress  [Clear to Auscultation] : lungs were clear to auscultation bilaterally [Normal Rate] : normal rate  [Regular Rhythm] : with a regular rhythm [No Edema] : there was no peripheral edema [Soft] : abdomen soft [Non Tender] : non-tender [No CVA Tenderness] : no CVA  tenderness [No Joint Swelling] : no joint swelling [Alert and Oriented x3] : oriented to person, place, and time [de-identified] : poor coordination needs walker

## 2024-08-26 NOTE — HISTORY OF PRESENT ILLNESS
[FreeTextEntry1] : no current complaints new patient CPE [de-identified] : complex medical hx most care in [place] had ICD inserted in [place] 2019 moved to [facility] 2 months ago there is a hx of thyroid ,prostate  and colon cancer states refuses any further testing for any cancer any refuses anyntreatment

## 2024-08-26 NOTE — ASSESSMENT
[FreeTextEntry1] : complex history Most recent hospitalization [facility] after defibrillator discharged wanted this to be de-activated but unclear if this was done currently resides in [facility] adult home [[facility]] no current acute complaints  states that all he wants ia a new walker,shower chair and diapers will continue cardiology f/u declines any oncology interventions

## 2024-08-26 NOTE — HISTORY OF PRESENT ILLNESS
[FreeTextEntry1] : no current complaints new patient CPE [de-identified] : complex medical hx most care in [place] had ICD inserted in [place] 2019 moved to [facility] 2 months ago there is a hx of thyroid ,prostate  and colon cancer states refuses any further testing for any cancer any refuses anyntreatment

## 2024-08-26 NOTE — HEALTH RISK ASSESSMENT
[Poor] : ~his/her~ current health as poor [Fair] :  ~his/her~ mood as fair [Yes] : Yes [Monthly or less (1 pt)] : Monthly or less (1 point) [1 or 2 (0 pts)] : 1 or 2 (0 points) [Never (0 pts)] : Never (0 points) [No] : In the past 12 months have you used drugs other than those required for medical reasons? No [One fall no injury in past year] : Patient reported one fall in the past year without injury [0] : 1) Little interest or pleasure doing things: Not at all (0) [1] : 2) Feeling down, depressed, or hopeless for several days (1) [Smoke Detector] : smoke detector [Carbon Monoxide Detector] : carbon monoxide detector [Safety elements used in home] : safety elements used in home [Current] : Current [Audit-CScore] : 1 [de-identified] : walking [de-identified] : healthy [de-identified] : pt had unsteady gait [[id number]] : 1 [Reports changes in hearing] : Reports no changes in hearing [Reports changes in vision] : Reports no changes in vision [Reports changes in dental health] : Reports no changes in dental health [de-identified] : assist living [de-identified] : need help [de-identified] : need help

## 2024-08-26 NOTE — REVIEW OF SYSTEMS
[Shortness Of Breath] : shortness of breath [Anxiety] : anxiety [Fever] : no fever [Chest Pain] : no chest pain [Abdominal Pain] : no abdominal pain [Joint Pain] : no joint pain [Itching] : no itching

## 2024-08-26 NOTE — ASSESSMENT
[FreeTextEntry1] : complex history Most recent hospitalization [facility] after defibrillator discharged wanted this to be de-activated but unclear if this was done currently resides in [facility] adult home [[facility]] no current acute complaints  states that all he wants ia a new walker,shower chair and diapers will continue cardiology f/u declines any oncology interventions

## 2024-08-26 NOTE — HEALTH RISK ASSESSMENT
[Poor] : ~his/her~ current health as poor [Fair] :  ~his/her~ mood as fair [Yes] : Yes [Monthly or less (1 pt)] : Monthly or less (1 point) [1 or 2 (0 pts)] : 1 or 2 (0 points) [Never (0 pts)] : Never (0 points) [No] : In the past 12 months have you used drugs other than those required for medical reasons? No [One fall no injury in past year] : Patient reported one fall in the past year without injury [0] : 1) Little interest or pleasure doing things: Not at all (0) [1] : 2) Feeling down, depressed, or hopeless for several days (1) [Smoke Detector] : smoke detector [Carbon Monoxide Detector] : carbon monoxide detector [Safety elements used in home] : safety elements used in home [Current] : Current [Audit-CScore] : 1 [de-identified] : walking [de-identified] : healthy [de-identified] : pt had unsteady gait [[id number]] : 1 [Reports changes in hearing] : Reports no changes in hearing [Reports changes in vision] : Reports no changes in vision [Reports changes in dental health] : Reports no changes in dental health [de-identified] : assist living [de-identified] : need help [de-identified] : need help

## 2024-08-26 NOTE — PLAN
[FreeTextEntry1] : case d/w nursing to obtain new walker blood sent for routine labs to obtain med list from [facility]

## 2024-08-26 NOTE — HISTORY OF PRESENT ILLNESS
[FreeTextEntry1] : no current complaints new patient CPE [de-identified] : complex medical hx most care in [place] had ICD inserted in [place] 2019 moved to [facility] 2 months ago there is a hx of thyroid ,prostate  and colon cancer states refuses any further testing for any cancer any refuses anyntreatment

## 2024-08-26 NOTE — PHYSICAL EXAM
[No Acute Distress] : no acute distress [No JVD] : no jugular venous distention [No Respiratory Distress] : no respiratory distress  [Clear to Auscultation] : lungs were clear to auscultation bilaterally [Normal Rate] : normal rate  [Regular Rhythm] : with a regular rhythm [No Edema] : there was no peripheral edema [Soft] : abdomen soft [Non Tender] : non-tender [No CVA Tenderness] : no CVA  tenderness [No Joint Swelling] : no joint swelling [Alert and Oriented x3] : oriented to person, place, and time [de-identified] : poor coordination needs walker

## 2024-08-26 NOTE — ASSESSMENT
[FreeTextEntry1] : complex history Most recent hospitalization [facility] after defibrillator discharged wanted this to be de-activated but unclear if this was done currently resides in [facility] adult home [[facility]] no current acute complaints  states that all he wants ia a new walker,shower chair and diapers will continue cardiology f/u declines any oncology interventions

## 2024-08-26 NOTE — PHYSICAL EXAM
[No Acute Distress] : no acute distress [No JVD] : no jugular venous distention [No Respiratory Distress] : no respiratory distress  [Clear to Auscultation] : lungs were clear to auscultation bilaterally [Normal Rate] : normal rate  [Regular Rhythm] : with a regular rhythm [No Edema] : there was no peripheral edema [Soft] : abdomen soft [Non Tender] : non-tender [No CVA Tenderness] : no CVA  tenderness [No Joint Swelling] : no joint swelling [Alert and Oriented x3] : oriented to person, place, and time [de-identified] : poor coordination needs walker

## 2024-08-26 NOTE — HEALTH RISK ASSESSMENT
[Poor] : ~his/her~ current health as poor [Fair] :  ~his/her~ mood as fair [Yes] : Yes [Monthly or less (1 pt)] : Monthly or less (1 point) [1 or 2 (0 pts)] : 1 or 2 (0 points) [Never (0 pts)] : Never (0 points) [No] : In the past 12 months have you used drugs other than those required for medical reasons? No [One fall no injury in past year] : Patient reported one fall in the past year without injury [0] : 1) Little interest or pleasure doing things: Not at all (0) [1] : 2) Feeling down, depressed, or hopeless for several days (1) [Smoke Detector] : smoke detector [Carbon Monoxide Detector] : carbon monoxide detector [Safety elements used in home] : safety elements used in home [Current] : Current [Audit-CScore] : 1 [de-identified] : walking [de-identified] : healthy [de-identified] : pt had unsteady gait [[id number]] : 1 [Reports changes in hearing] : Reports no changes in hearing [Reports changes in vision] : Reports no changes in vision [Reports changes in dental health] : Reports no changes in dental health [de-identified] : assist living [de-identified] : need help [de-identified] : need help

## 2024-09-18 ENCOUNTER — NON-APPOINTMENT (OUTPATIENT)
Age: 64
End: 2024-09-18

## 2024-09-18 ENCOUNTER — APPOINTMENT (OUTPATIENT)
Dept: ELECTROPHYSIOLOGY | Facility: CLINIC | Age: 64
End: 2024-09-18

## 2024-09-24 ENCOUNTER — TRANSCRIPTION ENCOUNTER (OUTPATIENT)
Age: 64
End: 2024-09-24

## 2024-09-25 ENCOUNTER — TRANSCRIPTION ENCOUNTER (OUTPATIENT)
Age: 64
End: 2024-09-25

## 2025-01-03 NOTE — ED PROVIDER NOTE - CARE PLAN

## 2025-03-05 NOTE — ED PROVIDER NOTE - NSDCPRINTRESULTS_ED_ALL_ED
Patient requests all Lab, Cardiology, and Radiology Results on their Discharge Instructions
1-2 drinks